# Patient Record
Sex: FEMALE | Race: WHITE | NOT HISPANIC OR LATINO | Employment: FULL TIME | URBAN - METROPOLITAN AREA
[De-identification: names, ages, dates, MRNs, and addresses within clinical notes are randomized per-mention and may not be internally consistent; named-entity substitution may affect disease eponyms.]

---

## 2017-01-11 ENCOUNTER — ALLSCRIPTS OFFICE VISIT (OUTPATIENT)
Dept: OTHER | Facility: OTHER | Age: 27
End: 2017-01-11

## 2017-05-17 ENCOUNTER — ALLSCRIPTS OFFICE VISIT (OUTPATIENT)
Dept: OTHER | Facility: OTHER | Age: 27
End: 2017-05-17

## 2017-08-15 ENCOUNTER — ALLSCRIPTS OFFICE VISIT (OUTPATIENT)
Dept: OTHER | Facility: OTHER | Age: 27
End: 2017-08-15

## 2017-12-06 ENCOUNTER — ALLSCRIPTS OFFICE VISIT (OUTPATIENT)
Dept: OTHER | Facility: OTHER | Age: 27
End: 2017-12-06

## 2017-12-07 NOTE — PROGRESS NOTES
Assessment  1  Migraine with aura (346 00) (G43 109)    Plan  Migraine with aura    · Amitriptyline HCl - 100 MG Oral Tablet; TAKE 1 TABLET AT BEDTIME   Rx By: Bhupinder Jung; Dispense: 30 Days ; #:30 Tablet; Refill: 3;Migraine with aura; THELMA = N; Verified Transmission to [facility] [address]; Last Updated By: System, [employer]; 12/6/2017 4:17:31 PM   · Verapamil HCl  MG Oral Capsule Extended Release 24 Hour; take onecapsule twice daily   Rx By: Bhupinder Jung; Dispense: 0 Days ; #:60 Capsule Extended Release 24 Hour; Refill: 4;Migraine with aura; THELMA = N; Verified Transmission to [facility] [address]; Last Updated By: System [employer]; 12/6/2017 4:17:31 PM    Discussion/Summary  Discussion Summary: At present, she's fairly controlled with her headaches except weather change  decrease elavil to 100mg qhs and see how she does on it  She is having weight gain and hyperlipidemia from it's chronic use  resume verapamil 120mg bid  natural options to add on for headaches such as magnesium, CoQ10, butter stanislav and melatonin  will resume abortive meds naproxen and imitrex as needed  Counseling Documentation With Imm: The patient was counseled regarding instructions for management,-- risk factor reductions,-- prognosis,-- patient and family education,-- impressions,-- risks and benefits of treatment options,-- importance of compliance with treatment  total time of encounter was 30 minutes-- and-- 20 minutes was spent counseling  Goals and Barriers: The patient has the current Goals: To lower elavil  Patient's Capacity to Self-Care: Patient is able to Self-Care  Medication SE Review and Pt Understands Tx: Possible side effects of new medications were reviewed with the patient/guardian today  The treatment plan was reviewed with the patient/guardian  The patient/guardian understands and agrees with the treatment plan    Patient Guardian understands agrees:  The treatment plan was reviewed with the patient/guardian  The patient/guardian understands and agrees with the treatment plan    Headache [facility]:  The patient was counseled regarding;   Discussed side effects of all medications prescribed today to the patient in detail  Patient education was completed today and we also discussed precautions for rebound headaches  --   When patient has a moderate to severe headache, they should seek rest, initiate relaxation and apply cold compresses to the head  Also recommended to the patient :  1  Maintain regular sleep schedule -- 2  Limit over the counter medications  (No more than 3 times a week)  -- 3  Maintain headache diary  -- 4  Limit caffeine to 1-2 cups a day or less  -- 5  Avoid dietary trigger  (list given to the patient and reviewed with them)  -- 6  Patient is to have regular frequent meals to prevent headache onset  Chief Complaint  Chief Complaint Free Text Note Form: migraines      History of Present Illness  HPI: Ms Elise Flowers is a 34 yo F with h/o migraines since childhood presents as follow up  Patient is a former patient of Dr Vanegas Listen  She was getting about 2 migraines a month  She takes combination of naproxen and imitrex and it works  She gets headaches in frontal region associated with nausea, photo and phonophobia  Her triggers are dehydration, stress, chocolate, food with nitrates, wine  Today she has brought calender with her of headaches and has noted that Rain and stress are major triggers for her migraines  She is interested in having elavil lowered or be tapered off as it has resulted in some weight gain  At last visit she was also started on Verapamil 120mg ER and is on bid  She is tolerating this well  In August and Sept she had 4 headaches in a cluster for 4 days around rain  currently on elavil 150mg qhs for 2 years  hasn't been tried on topamax or any other preventive medication  She denies any side effects  Review of Systems  Neurological ROS:  Neurological General: headache  Active Problems  1  Migraine headache (346 90) (G43 909)   2  Migraine with aura (346 00) (G43 109)   3  Seasonal allergies (477 9) (J30 2)    Past Medical History  1  History of Anxiety (300 00) (F41 9)   2  History of Cyclical vomiting syndrome (536 2) (G43 A0)   3  History of depression (V11 8) (Z86 59)    Surgical History  1  History of Oral Surgery Tooth Extraction [place] Tooth   2  History of Upper Gastrointestinal Endoscopy (Therapeutic)    Family History  Mother    1  Family history of migraine headaches (V17 2) (Z82 0)  Father    2  Family history of Seasonal affective disorder  Sister    3  Family history of Anxiety    Social History     · Alcohol use (V49 89) (Z78 9)   · Denied: History of Drug use   · Never a smoker    Current Meds   1  Allegra 180 MG TABS; Therapy: (Recorded:27Tro4178) to Recorded   2  Amitriptyline HCl - 150 MG Oral Tablet; TAKE 1 TABLET AT BEDTIME; Therapy: 97PBS0505 to (Evaluate:12Jan2018)  Requested for: 50Nvq7095; Last Rx:[id number] Ordered   3  Naproxen 500 MG Oral Tablet; Therapy: 99VZO0517 to Recorded   4  SUMAtriptan Succinate 100 MG Oral Tablet; TAKE 1 TABLET AT ONSET OF MIGRAINE HEADACHE  MAY REPEAT IN 2 HOURS IF NEEDED; Therapy: 99NBL0983 to (Evaluate:14Oct2017) Recorded   5  Verapamil HCl  MG Oral Tablet Extended Release; TAKE 1 TABLET TWICE DAILY,  WITH MORNING AND EVENING MEAL; Therapy: 75UHT2748 to (Evaluate:12Jan2018)  Requested for: 51Vxd7089; Last Rx:[id number] Ordered    Allergies  1  No Known Drug Allergies    2  Other   3  Chocolate    Vitals  Signs   Recorded: 68EFT5674 03:59PM   Heart Rate: 93  Systolic: 199, RUE, Sitting  Diastolic: 98, RUE, Sitting  Height: 5 ft 5 in  Weight: 274 lb   BMI Calculated: 45 6  BSA Calculated: 2 26    Physical Exam   Constitutional  General appearance: No acute distress, well appearing and well nourished     Eyes  Ophthalmoscopic examination: Abnormal   Bilateral optic discs: normal   Musculoskeletal  Gait and station: Normal gait, stance and balance  Muscle strength: Normal strength throughout  Muscle tone: No atrophy, abnormal movements, flaccidity, cogwheeling or spasticity  Neurologic  Orientation to person, place, and time: Normal    Recent and remote memory: Demonstrates normal memory  Attention span and concentration: Normal thought process and attention span  Language: Names objects, able to repeat phrases and speaks spontaneously  Fund of knowledge: Normal vocabulary with appropriate knowledge of current events and past history     2nd cranial nerve: Normal    3rd, 4th, and 6th cranial nerves: Normal    5th cranial nerve: Normal    7th cranial nerve: Normal    8th cranial nerve: Normal    9th cranial nerve: Normal    11th cranial nerve: Normal    12th cranial nerve: Normal    Sensation: Normal    Reflexes: Normal    Coordination: Normal        Signatures   Electronically signed by : COCO Novoa ; Dec  6 2017  4:22PM EST                       (Author)

## 2018-01-13 VITALS
HEIGHT: 65 IN | SYSTOLIC BLOOD PRESSURE: 122 MMHG | DIASTOLIC BLOOD PRESSURE: 80 MMHG | WEIGHT: 273 LBS | BODY MASS INDEX: 45.48 KG/M2

## 2018-01-23 VITALS
HEIGHT: 65 IN | DIASTOLIC BLOOD PRESSURE: 98 MMHG | SYSTOLIC BLOOD PRESSURE: 138 MMHG | BODY MASS INDEX: 45.65 KG/M2 | HEART RATE: 93 BPM | WEIGHT: 274 LBS

## 2018-03-29 ENCOUNTER — OFFICE VISIT (OUTPATIENT)
Dept: NEUROLOGY | Facility: CLINIC | Age: 28
End: 2018-03-29
Payer: COMMERCIAL

## 2018-03-29 VITALS
BODY MASS INDEX: 45.15 KG/M2 | HEIGHT: 65 IN | WEIGHT: 271 LBS | HEART RATE: 98 BPM | DIASTOLIC BLOOD PRESSURE: 88 MMHG | SYSTOLIC BLOOD PRESSURE: 118 MMHG

## 2018-03-29 DIAGNOSIS — G43.109 MIGRAINE WITH AURA AND WITHOUT STATUS MIGRAINOSUS, NOT INTRACTABLE: Primary | ICD-10-CM

## 2018-03-29 PROCEDURE — 99215 OFFICE O/P EST HI 40 MIN: CPT | Performed by: PSYCHIATRY & NEUROLOGY

## 2018-03-29 RX ORDER — AMITRIPTYLINE HYDROCHLORIDE 25 MG/1
TABLET, FILM COATED ORAL
Qty: 60 TABLET | Refills: 2 | Status: SHIPPED | OUTPATIENT
Start: 2018-03-29 | End: 2021-06-03 | Stop reason: ALTCHOICE

## 2018-03-29 RX ORDER — VERAPAMIL HYDROCHLORIDE 120 MG/1
120 CAPSULE, EXTENDED RELEASE ORAL 2 TIMES DAILY
Refills: 0 | COMMUNITY
Start: 2018-03-15 | End: 2018-05-30 | Stop reason: SDUPTHER

## 2018-03-29 RX ORDER — FEXOFENADINE HCL 180 MG/1
180 TABLET ORAL DAILY
COMMUNITY
End: 2019-07-30 | Stop reason: ALTCHOICE

## 2018-03-29 RX ORDER — TOPIRAMATE 100 MG/1
100 TABLET, FILM COATED ORAL
Qty: 30 TABLET | Refills: 0 | Status: SHIPPED | OUTPATIENT
Start: 2018-04-19 | End: 2018-05-30 | Stop reason: SDUPTHER

## 2018-03-29 RX ORDER — AMITRIPTYLINE HYDROCHLORIDE 100 MG/1
100 TABLET, FILM COATED ORAL
Refills: 0 | COMMUNITY
Start: 2018-03-15 | End: 2018-03-29 | Stop reason: SDUPTHER

## 2018-03-29 RX ORDER — TOPIRAMATE 25 MG/1
TABLET ORAL
Qty: 42 TABLET | Refills: 0 | Status: SHIPPED | OUTPATIENT
Start: 2018-03-29 | End: 2018-05-30

## 2018-03-29 RX ORDER — SUMATRIPTAN 100 MG/1
100 TABLET, FILM COATED ORAL ONCE AS NEEDED
Refills: 0 | COMMUNITY
Start: 2018-02-06 | End: 2018-07-19 | Stop reason: SDUPTHER

## 2018-03-29 RX ORDER — NAPROXEN 500 MG/1
TABLET ORAL
Refills: 0 | COMMUNITY
Start: 2018-02-06 | End: 2018-10-24 | Stop reason: SDUPTHER

## 2018-03-29 NOTE — PROGRESS NOTES
Return NeuroOutpatient Note        Sharon Schmitz  [id number]  9 y o   1990       Migraine        History obtained from: Patient     HPI/Subjective:    Ms Strong  is a 34 yo F with h/o migraines since childhood presents as follow up  Patient is a former patient of Dr Lynne Gains  She was getting 2 migraines a month but some months she can get upto 4 a month depending on weather change, humidity level  Abortive ones do help but she may feel odd sensation in the beginning for few minutes  At last visit, she wanted to decrease elavil for fear of weight loss but she hasn't noticed any change in her weight  She says it makes her difficult to loose weight  She is interested in switching from elavil to another agent  Past Medical History:   Diagnosis Date    H/O anxiety disorder     H/O: depression     Migraine with aura     Seasonal allergies      Social History     Social History    Marital status: Single     Spouse name: N/A    Number of children: N/A    Years of education: N/A     Occupational History    Not on file  Social History Main Topics    Smoking status: Never Smoker    Smokeless tobacco: Never Used    Alcohol use Yes      Comment: rarely    Drug use: No    Sexual activity: Not on file     Other Topics Concern    Not on file     Social History Narrative    No narrative on file     Family History   Problem Relation Age of Onset    Migraines Mother     Seasonal affective disorder Father     Anxiety disorder Sister     Migraines Maternal Aunt     Migraines Maternal Grandmother      Allergies   Allergen Reactions    Chocolate Headache    Other Headache     Annotation - [id number]: NITRATES- WINE     No current outpatient prescriptions on file prior to visit  No current facility-administered medications on file prior to visit  Review of Systems   Refer to positive review of systems in HPI     Constitutional- No fever  Eyes- No visual change  ENT- Hearing normal  CV- No chest pain  Resp- No Shortness of breath  GI- No diarrhea  - Bladder normal  MS- No Arthritis   Skin- No rash  Psych- No depression  Endo- No DM  Heme- No nodes    Vitals:    03/29/18 1322   BP: 118/88   BP Location: Left arm   Patient Position: Sitting   Pulse: 98   Weight: 123 kg (271 lb)   Height: 5' 5" (1 651 m)       PHYSICAL EXAM:  Appearance: No Acute Distress  Ophthalmoscopic: Disc Flat, Normal fundus  Mental status:  Orientation: Awake, Alert, and Orientedx3  Memory: Registation 3/3 Recall 3/3  Attention: normal  Knowledge: good  Language: No aphasia  Speech: No dysarthria  Cranial Nerves:  2 No Visual Defect on Confrontation, Pupils round, equal, reactive to light  3,4,6 Extraocular Movements Intact, no nystagmus  5 Facial Sensation Intact  7 No facial asymmetry  8 Intact hearing  9,10 Palate symmetric, normal gag  11 Good shoulder shrug  12 Tongue Midline  Gait: Stable  Coordination: No ataxia with finger to nose testing, and heel to shin  Sensory: Intact, Symmetric to pinprick, light touch, vibration, and joint position  Muscle Tone: Normal              Muscle exam:  Arm Right Left Leg Right Left   Deltoid 5/5 5/5 Iliopsoas 5/5 5/5   Biceps 5/5 5/5 Quads 5/5 5/5   Triceps 5/5 5/5 Hamstrings 5/5 5/5   Wrist Extension 5/5 5/5 Ankle Dorsi Flexion 5/5 5/5   Wrist Flexion 5/5 5/5 Ankle Plantar Flexion 5/5 5/5   Interossei 5/5 5/5 Ankle Eversion 5/5 5/5   APB 5/5 5/5 Ankle Inversion 5/5 5/5       Reflexes   RJ BJ TJ KJ AJ Plantars Santiago's   Right 2+ 2+ 2+ 2+ 2+ Downgoing Not present   Left 2+ 2+ 2+ 2+ 2+ Downgoing Not present     Personal review of  Labs:                    Diagnoses and all orders for this visit:        1  Migraine with aura and without status migrainosus, not intractable  topiramate (TOPAMAX) 25 mg tablet    topiramate (TOPAMAX) 100 mg tablet    amitriptyline (ELAVIL) 25 mg tablet     Patient has remained stable in terms of frequency of migraines     She does however wish to switch from elavil to another agent so will use topamax  Gave her instructions on how to start and around mid April slowly taper elavil to 25mg nightly  She will need to resume making notes on calender  She will resume verapamil 120mg bid  She may resume prn imitrex  Counseling Documentation:  The patient and/or patient's family were  counseled regarding diagnostic results  Instructions for management,risk factor reductions,prognosis of disease were discussed  Patient and family were educated regarding impressions,risks and benefits of treatment options,importance of compliance with treatment        Total time of encounter:  40 min  More than 50% of the time was used in counseling and/or coordination of care  Extent of counseling and/or coordination of care        Tony Sutherland MD  [facility]  [facility] [address]  [phone]

## 2018-05-29 ENCOUNTER — TELEPHONE (OUTPATIENT)
Dept: NEUROLOGY | Facility: CLINIC | Age: 28
End: 2018-05-29

## 2018-05-29 NOTE — TELEPHONE ENCOUNTER
Patient is now completely off Amitriptyline- She has been on Topamax 100 mg at bedtime since April  She was curious if there was anything else you wanted her to be? She also take Verapamil       Call back- [phone]

## 2018-05-29 NOTE — TELEPHONE ENCOUNTER
Tell her No  From last visit, she was stable in terms of her headaches  Due to weight gain, we had switched her to topamax, if headaches are worse, then may have to place her back on elavil

## 2018-05-30 DIAGNOSIS — G43.109 MIGRAINE WITH AURA AND WITHOUT STATUS MIGRAINOSUS, NOT INTRACTABLE: ICD-10-CM

## 2018-05-30 RX ORDER — TOPIRAMATE 100 MG/1
100 TABLET, FILM COATED ORAL
Qty: 30 TABLET | Refills: 1 | Status: SHIPPED | OUTPATIENT
Start: 2018-05-30 | End: 2018-10-24 | Stop reason: SDUPTHER

## 2018-05-30 RX ORDER — VERAPAMIL HYDROCHLORIDE 120 MG/1
120 CAPSULE, EXTENDED RELEASE ORAL 2 TIMES DAILY
Qty: 60 CAPSULE | Refills: 3 | Status: SHIPPED | OUTPATIENT
Start: 2018-05-30 | End: 2018-10-24 | Stop reason: SDUPTHER

## 2018-05-30 NOTE — TELEPHONE ENCOUNTER
[facility] does not wish to start any new medication at this time  Patient understood, requested refill for 2 meds

## 2018-07-17 RX ORDER — SUMATRIPTAN 100 MG/1
TABLET, FILM COATED ORAL
Qty: 9 TABLET | Refills: 1 | OUTPATIENT
Start: 2018-07-17

## 2018-07-19 DIAGNOSIS — R51.9 HEAD ACHE: Primary | ICD-10-CM

## 2018-07-19 RX ORDER — SUMATRIPTAN 100 MG/1
100 TABLET, FILM COATED ORAL SEE ADMIN INSTRUCTIONS
Qty: 9 TABLET | Refills: 0 | Status: SHIPPED | OUTPATIENT
Start: 2018-07-19 | End: 2018-10-24 | Stop reason: SDUPTHER

## 2018-07-25 ENCOUNTER — OFFICE VISIT (OUTPATIENT)
Dept: NEUROLOGY | Facility: CLINIC | Age: 28
End: 2018-07-25
Payer: COMMERCIAL

## 2018-07-25 VITALS
DIASTOLIC BLOOD PRESSURE: 98 MMHG | WEIGHT: 260 LBS | BODY MASS INDEX: 43.32 KG/M2 | SYSTOLIC BLOOD PRESSURE: 148 MMHG | HEART RATE: 97 BPM | HEIGHT: 65 IN

## 2018-07-25 DIAGNOSIS — G43.101 MIGRAINE WITH AURA AND WITH STATUS MIGRAINOSUS, NOT INTRACTABLE: Primary | ICD-10-CM

## 2018-07-25 PROCEDURE — 99215 OFFICE O/P EST HI 40 MIN: CPT | Performed by: PSYCHIATRY & NEUROLOGY

## 2018-07-25 RX ORDER — DIPHENHYDRAMINE HCL 25 MG
25 TABLET ORAL EVERY 8 HOURS PRN
Qty: 15 TABLET | Refills: 0 | Status: SHIPPED | OUTPATIENT
Start: 2018-07-25 | End: 2020-12-31 | Stop reason: ALTCHOICE

## 2018-07-25 RX ORDER — BUPROPION HYDROCHLORIDE 150 MG/1
150 TABLET ORAL DAILY
Refills: 0 | COMMUNITY
Start: 2018-06-08 | End: 2019-07-30

## 2018-07-25 RX ORDER — METHYLPREDNISOLONE 4 MG/1
TABLET ORAL
Qty: 21 TABLET | Refills: 0 | Status: SHIPPED | OUTPATIENT
Start: 2018-07-25 | End: 2020-12-31 | Stop reason: ALTCHOICE

## 2018-07-25 RX ORDER — PROCHLORPERAZINE MALEATE 10 MG
10 TABLET ORAL EVERY 8 HOURS PRN
Qty: 15 TABLET | Refills: 0 | Status: SHIPPED | OUTPATIENT
Start: 2018-07-25 | End: 2019-01-29 | Stop reason: SDUPTHER

## 2018-07-25 RX ORDER — DICLOFENAC SODIUM 75 MG/1
75 TABLET, DELAYED RELEASE ORAL 3 TIMES DAILY PRN
Qty: 15 TABLET | Refills: 0 | Status: SHIPPED | OUTPATIENT
Start: 2018-07-25 | End: 2018-10-24

## 2018-07-25 RX ORDER — BUTALBITAL, ACETAMINOPHEN AND CAFFEINE 50; 325; 40 MG/1; MG/1; MG/1
1 TABLET ORAL DAILY PRN
Qty: 30 TABLET | Refills: 0 | Status: SHIPPED | OUTPATIENT
Start: 2018-07-25 | End: 2018-10-24 | Stop reason: SDUPTHER

## 2018-07-25 NOTE — PROGRESS NOTES
Return NeuroOutpatient Note        Yamini Del Rosario  [id number]  20 y o   1990       Migraine        History obtained from: Patient     HPI/Subjective:    Ms Arvin Melendez is a 32 yo F with h/o migraines since childhood presents as follow up  Patient is a former patient of Dr Sushila Melgar  She was getting 2 migraines a month but some months she can get upto 4 a month depending on weather change, humidity level  Abortive ones do help but she may feel odd sensation in the beginning for few minutes  At last visit, she wanted to decrease elavil for fear of weight loss but she hasn't noticed any change in her weight  At last visit, we had switched her from elavil to topamax  This month, we have had a lot of rain and she has had a lot of headaches this month almost daily  She is under a lot of stress  She says sumavel used to work better than imitrex but her insurance was not covering  Now it may and she would like to try it  She is interested in new option available, Aimovig  Past Medical History:   Diagnosis Date    H/O anxiety disorder     H/O: depression     Migraine with aura     Seasonal allergies      Social History     Social History    Marital status: Single     Spouse name: N/A    Number of children: N/A    Years of education: N/A     Occupational History    Not on file       Social History Main Topics    Smoking status: Never Smoker    Smokeless tobacco: Never Used    Alcohol use Yes      Comment: rarely    Drug use: No    Sexual activity: Not on file     Other Topics Concern    Not on file     Social History Narrative    No narrative on file     Family History   Problem Relation Age of Onset    Migraines Mother     Seasonal affective disorder Father     Anxiety disorder Sister     Migraines Maternal Aunt     Migraines Maternal Grandmother      Allergies   Allergen Reactions    Chocolate Headache    Other Headache     Annotation - [id number]: NITRATES- WINE     Current Outpatient Prescriptions on File Prior to Visit   Medication Sig Dispense Refill    fexofenadine (ALLEGRA ALLERGY) 180 MG tablet Take 180 mg by mouth daily        naproxen (NAPROSYN) 500 mg tablet take 1 tablet by mouth WITH SUMTRIPTAN AT THE ONSET OF HEADACHE  0    SUMAtriptan (IMITREX) 100 mg tablet Take 1 tablet (100 mg total) by mouth see administration instructions At onset of Migraine 9 tablet 0    topiramate (TOPAMAX) 100 mg tablet Take 1 tablet (100 mg total) by mouth daily at bedtime Starting April 19th 30 tablet 1    verapamil (VERELAN PM) 120 MG 24 hr capsule Take 1 capsule (120 mg total) by mouth 2 (two) times a day 60 capsule 3    amitriptyline (ELAVIL) 25 mg tablet On April 15th, decrease elavil to 50mg nightly and in another 2 weeks take 25mg at bed time  60 tablet 2     No current facility-administered medications on file prior to visit  Review of Systems   Refer to positive review of systems in HPI     Constitutional- No fever  Eyes- No visual change  ENT- Hearing normal  CV- No chest pain  Resp- No Shortness of breath  GI- No diarrhea  - Bladder normal  MS- No Arthritis   Skin- No rash  Psych- No depression  Endo- No DM  Heme- No nodes    Vitals:    07/25/18 1447   BP: 148/98   BP Location: Left arm   Patient Position: Sitting   Pulse: 97   Weight: 118 kg (260 lb)   Height: 5' 5" (1 651 m)       PHYSICAL EXAM:  Appearance: No Acute Distress  Ophthalmoscopic: Disc Flat, Normal fundus  Mental status:  Orientation: Awake, Alert, and Orientedx3  Memory: Registation 3/3 Recall 3/3  Attention: normal  Knowledge: good  Language: No aphasia  Speech: No dysarthria  Cranial Nerves:  2 No Visual Defect on Confrontation, Pupils round, equal, reactive to light  3,4,6 Extraocular Movements Intact, no nystagmus  5 Facial Sensation Intact  7 No facial asymmetry  8 Intact hearing  9,10 Palate symmetric, normal gag  11 Good shoulder shrug  12 Tongue Midline  Gait: Stable  Coordination: No ataxia with finger to nose testing, and heel to shin  Sensory: Intact, Symmetric to pinprick, light touch, vibration, and joint position  Muscle Tone: Normal              Muscle exam:  Arm Right Left Leg Right Left   Deltoid 5/5 5/5 Iliopsoas 5/5 5/5   Biceps 5/5 5/5 Quads 5/5 5/5   Triceps 5/5 5/5 Hamstrings 5/5 5/5   Wrist Extension 5/5 5/5 Ankle Dorsi Flexion 5/5 5/5   Wrist Flexion 5/5 5/5 Ankle Plantar Flexion 5/5 5/5   Interossei 5/5 5/5 Ankle Eversion 5/5 5/5   APB 5/5 5/5 Ankle Inversion 5/5 5/5       Reflexes   RJ BJ TJ KJ AJ Plantars Santiago's   Right 2+ 2+ 2+ 2+ 2+ Downgoing Not present   Left 2+ 2+ 2+ 2+ 2+ Downgoing Not present     Personal review of  Labs:                    Diagnoses and all orders for this visit:        1  Migraine with aura and with status migrainosus, not intractable  Methylprednisolone 4 MG TBPK    diclofenac (VOLTAREN) 75 mg EC tablet    diphenhydrAMINE (BENADRYL) 25 mg tablet    prochlorperazine (COMPAZINE) 10 mg tablet    butalbital-acetaminophen-caffeine (FIORICET,ESGIC) -40 mg per tablet    SUMAtriptan Succinate 6 MG/0 5ML SOTJ     Patient has now had return of headaches for past month  Weather could have contributed but is unclear  To break the cycle, will give her medrol dose pack and migraine oral cocktail to take as needed  Will arrange paper work for Aimovig injections 140mg sc monthly  She will resume verapamil 120mg bid and topamax 100mg nightly  She may resume prn imitrex or sumavel inj  She did loose weight after stopping elavil  Counseling Documentation:  The patient and/or patient's family were  counseled regarding diagnostic results  Instructions for management,risk factor reductions,prognosis of disease were discussed  Patient and family were educated regarding impressions,risks and benefits of treatment options,importance of compliance with treatment        Total time of encounter:  40 min  More than 50% of the time was used in counseling and/or coordination of care  Extent of counseling and/or coordination of care        MD Sai Scanlon [facility]  [facility] [address]  [address]  [phone]

## 2018-08-15 DIAGNOSIS — G43.109 MIGRAINE WITH AURA AND WITHOUT STATUS MIGRAINOSUS, NOT INTRACTABLE: ICD-10-CM

## 2018-08-16 NOTE — TELEPHONE ENCOUNTER
A verbal refill was called in for Topamax 100 mg 1 hs #30 with 1 refill to [facility] in [facility]

## 2018-08-24 RX ORDER — TOPIRAMATE 100 MG/1
TABLET, FILM COATED ORAL
Qty: 30 TABLET | Refills: 1 | OUTPATIENT
Start: 2018-08-24

## 2018-10-24 ENCOUNTER — OFFICE VISIT (OUTPATIENT)
Dept: NEUROLOGY | Facility: CLINIC | Age: 28
End: 2018-10-24
Payer: COMMERCIAL

## 2018-10-24 VITALS
HEART RATE: 87 BPM | HEIGHT: 65 IN | SYSTOLIC BLOOD PRESSURE: 122 MMHG | WEIGHT: 240 LBS | BODY MASS INDEX: 39.99 KG/M2 | DIASTOLIC BLOOD PRESSURE: 90 MMHG

## 2018-10-24 DIAGNOSIS — G43.101 MIGRAINE WITH AURA AND WITH STATUS MIGRAINOSUS, NOT INTRACTABLE: ICD-10-CM

## 2018-10-24 DIAGNOSIS — G43.109 MIGRAINE WITH AURA AND WITHOUT STATUS MIGRAINOSUS, NOT INTRACTABLE: Primary | ICD-10-CM

## 2018-10-24 PROCEDURE — 99214 OFFICE O/P EST MOD 30 MIN: CPT | Performed by: PSYCHIATRY & NEUROLOGY

## 2018-10-24 RX ORDER — LORAZEPAM 0.5 MG/1
TABLET ORAL
Refills: 0 | COMMUNITY
Start: 2018-10-22

## 2018-10-24 RX ORDER — VERAPAMIL HYDROCHLORIDE 120 MG/1
120 CAPSULE, EXTENDED RELEASE ORAL 2 TIMES DAILY
Qty: 60 CAPSULE | Refills: 4 | Status: SHIPPED | OUTPATIENT
Start: 2018-10-24 | End: 2019-01-29 | Stop reason: SDUPTHER

## 2018-10-24 RX ORDER — SUMATRIPTAN 100 MG/1
100 TABLET, FILM COATED ORAL SEE ADMIN INSTRUCTIONS
Qty: 9 TABLET | Refills: 0 | Status: SHIPPED | OUTPATIENT
Start: 2018-10-24 | End: 2019-01-29 | Stop reason: SDUPTHER

## 2018-10-24 RX ORDER — NAPROXEN 500 MG/1
500 TABLET ORAL AS NEEDED
Qty: 30 TABLET | Refills: 0 | Status: SHIPPED | OUTPATIENT
Start: 2018-10-24 | End: 2019-08-27 | Stop reason: SDUPTHER

## 2018-10-24 RX ORDER — TOPIRAMATE 100 MG/1
100 TABLET, FILM COATED ORAL
Qty: 30 TABLET | Refills: 4 | Status: SHIPPED | OUTPATIENT
Start: 2018-10-24 | End: 2019-01-29 | Stop reason: SDUPTHER

## 2018-10-24 RX ORDER — BUTALBITAL, ACETAMINOPHEN AND CAFFEINE 50; 325; 40 MG/1; MG/1; MG/1
1 TABLET ORAL DAILY PRN
Qty: 30 TABLET | Refills: 0 | Status: SHIPPED | OUTPATIENT
Start: 2018-10-24 | End: 2019-01-29 | Stop reason: SDUPTHER

## 2018-10-24 NOTE — PROGRESS NOTES
Return NeuroOutpatient Note        Hanna Lang  [id number]  62 y o   1990       Migraine        History obtained from: Patient     HPI/Subjective:    Ms Devi Romero is a 32 yo F with h/o migraines since childhood presents as follow up  Patient is a former patient of Dr Erica Leroy  She was getting 2 migraines a month but some months she can get upto 4 a month depending on weather change, humidity level  Abortive ones do help but she may feel odd sensation in the beginning for few minutes  Due to weight gain, she wanted to come off of elavil so we had tapered it  She's still on verapamil 120mg bid and topamax 100mg daily  We had started her on Aimovig at last visit and thus far she has taken one session of 140mg  She does report that it's helped  Now she doesn't get prolonged duration of headaches  In month of Sept, she had about 5 days of headaches  Prior to that, she was getting cycle of daily headaches  She says she is under a lot of stress from work and grad school  She takes prn imitrex but would typically need to lie down after taking it  Past Medical History:   Diagnosis Date    H/O anxiety disorder     H/O: depression     Migraine with aura     Seasonal allergies      Social History     Social History    Marital status: Single     Spouse name: N/A    Number of children: N/A    Years of education: N/A     Occupational History    Not on file       Social History Main Topics    Smoking status: Never Smoker    Smokeless tobacco: Never Used    Alcohol use Yes      Comment: rarely    Drug use: No    Sexual activity: Not on file     Other Topics Concern    Not on file     Social History Narrative    No narrative on file     Family History   Problem Relation Age of Onset    Migraines Mother     Seasonal affective disorder Father     Anxiety disorder Sister     Migraines Maternal Aunt     Migraines Maternal Grandmother      Allergies   Allergen Reactions    Chocolate Headache    Other Headache     Annotation - [id number]: NITRATES- WINE     Current Outpatient Prescriptions on File Prior to Visit   Medication Sig Dispense Refill    buPROPion (WELLBUTRIN XL) 150 mg 24 hr tablet Take 150 mg by mouth daily  0    diphenhydrAMINE (BENADRYL) 25 mg tablet Take 1 tablet (25 mg total) by mouth every 8 (eight) hours as needed (migraine) 15 tablet 0    fexofenadine (ALLEGRA ALLERGY) 180 MG tablet Take 180 mg by mouth daily        prochlorperazine (COMPAZINE) 10 mg tablet Take 1 tablet (10 mg total) by mouth every 8 (eight) hours as needed for nausea or vomiting (migraine) With diclofenac sodium and benadryl 15 tablet 0    SUMAtriptan Succinate 6 MG/0 5ML SOTJ Inject 0 5 mL (6 mg total) under the skin once as needed (intractable migraine at onset) for up to 1 dose 6 Syringe 0    [DISCONTINUED] butalbital-acetaminophen-caffeine (FIORICET,ESGIC) -40 mg per tablet Take 1 tablet by mouth daily as needed for migraine 30 tablet 0    [DISCONTINUED] diclofenac (VOLTAREN) 75 mg EC tablet Take 1 tablet (75 mg total) by mouth 3 (three) times a day as needed (headache) 15 tablet 0    [DISCONTINUED] naproxen (NAPROSYN) 500 mg tablet take 1 tablet by mouth WITH SUMTRIPTAN AT THE ONSET OF HEADACHE  0    [DISCONTINUED] SUMAtriptan (IMITREX) 100 mg tablet Take 1 tablet (100 mg total) by mouth see administration instructions At onset of Migraine 9 tablet 0    [DISCONTINUED] topiramate (TOPAMAX) 100 mg tablet Take 1 tablet (100 mg total) by mouth daily at bedtime Starting April 19th 30 tablet 1    [DISCONTINUED] verapamil (VERELAN PM) 120 MG 24 hr capsule Take 1 capsule (120 mg total) by mouth 2 (two) times a day 60 capsule 3    amitriptyline (ELAVIL) 25 mg tablet On April 15th, decrease elavil to 50mg nightly and in another 2 weeks take 25mg at bed time   (Patient not taking: Reported on 10/24/2018 ) 60 tablet 2    Methylprednisolone 4 MG TBPK Use as directed on package (Patient not taking: Reported on 10/24/2018 ) 21 tablet 0     No current facility-administered medications on file prior to visit  Review of Systems   Refer to positive review of systems in HPI  Constitutional- No fever  Eyes- No visual change  ENT- Hearing normal  CV- No chest pain  Resp- No Shortness of breath  GI- No diarrhea  - Bladder normal  MS- No Arthritis   Skin- No rash  Psych- No depression  Endo- No DM  Heme- No nodes    Vitals:    10/24/18 1436   BP: 122/90   BP Location: Left arm   Patient Position: Sitting   Pulse: 87   Weight: 109 kg (240 lb)   Height: 5' 5" (1 651 m)       PHYSICAL EXAM:  Appearance: No Acute Distress  Ophthalmoscopic: Disc Flat, Normal fundus  Mental status:    Orientation: Awake, Alert, and Orientedx3  Memory: Registation 3/3 Recall 3/3  Attention: normal  Knowledge: good  Language: No aphasia  Speech: No dysarthria  Cranial Nerves:  2 No Visual Defect on Confrontation, Pupils round, equal, reactive to light  3,4,6 Extraocular Movements Intact, no nystagmus  5 Facial Sensation Intact  7 No facial asymmetry  8 Intact hearing  9,10 Palate symmetric, normal gag  11 Good shoulder shrug  12 Tongue Midline  Gait: Stable  Coordination: No ataxia with finger to nose testing, and heel to shin  Sensory: Intact, Symmetric to pinprick, light touch, vibration, and joint position  Muscle Tone: Normal              Muscle exam:  Arm Right Left Leg Right Left   Deltoid 5/5 5/5 Iliopsoas 5/5 5/5   Biceps 5/5 5/5 Quads 5/5 5/5   Triceps 5/5 5/5 Hamstrings 5/5 5/5   Wrist Extension 5/5 5/5 Ankle Dorsi Flexion 5/5 5/5   Wrist Flexion 5/5 5/5 Ankle Plantar Flexion 5/5 5/5   Interossei 5/5 5/5 Ankle Eversion 5/5 5/5   APB 5/5 5/5 Ankle Inversion 5/5 5/5       Reflexes   RJ BJ TJ KJ AJ Plantars Santiago's   Right 2+ 2+ 2+ 2+ 2+ Downgoing Not present   Left 2+ 2+ 2+ 2+ 2+ Downgoing Not present     Personal review of  Labs:                    Diagnoses and all orders for this visit:        1   Migraine with aura and without status migrainosus, not intractable  butalbital-acetaminophen-caffeine (FIORICET,ESGIC) -40 mg per tablet    verapamil (VERELAN PM) 120 MG 24 hr capsule    SUMAtriptan (IMITREX) 100 mg tablet    topiramate (TOPAMAX) 100 mg tablet    naproxen (NAPROSYN) 500 mg tablet   2  Migraine with aura and with status migrainosus, not intractable  butalbital-acetaminophen-caffeine (FIORICET,ESGIC) -40 mg per tablet       Patient is doing better now  Would resume prior preventive meds including Aimovig injections  She may resume prn imitrex or sumavel inj  She did loose weight after stopping elavil  Discussed that it's not advised to get pregnant while taking all these medications so she knows to plan it in advance  Counseling Documentation:  The patient and/or patient's family were  counseled regarding diagnostic results  Instructions for management,risk factor reductions,prognosis of disease were discussed  Patient and family were educated regarding impressions,risks and benefits of treatment options,importance of compliance with treatment        Total time of encounter:  30 min  More than 50% of the time was used in counseling and/or coordination of care  Extent of counseling and/or coordination of care        MD [facility]  [id number] [address]  [place]   [phone]

## 2019-01-29 ENCOUNTER — OFFICE VISIT (OUTPATIENT)
Dept: NEUROLOGY | Facility: CLINIC | Age: 29
End: 2019-01-29
Payer: COMMERCIAL

## 2019-01-29 VITALS
RESPIRATION RATE: 18 BRPM | HEIGHT: 65 IN | BODY MASS INDEX: 40.48 KG/M2 | HEART RATE: 93 BPM | DIASTOLIC BLOOD PRESSURE: 80 MMHG | SYSTOLIC BLOOD PRESSURE: 118 MMHG | WEIGHT: 243 LBS

## 2019-01-29 DIAGNOSIS — G43.101 MIGRAINE WITH AURA AND WITH STATUS MIGRAINOSUS, NOT INTRACTABLE: ICD-10-CM

## 2019-01-29 DIAGNOSIS — G43.109 MIGRAINE WITH AURA AND WITHOUT STATUS MIGRAINOSUS, NOT INTRACTABLE: ICD-10-CM

## 2019-01-29 DIAGNOSIS — G43.119 INTRACTABLE MIGRAINE WITH AURA WITHOUT STATUS MIGRAINOSUS: Primary | ICD-10-CM

## 2019-01-29 DIAGNOSIS — R11.0 NAUSEA: ICD-10-CM

## 2019-01-29 PROCEDURE — 99214 OFFICE O/P EST MOD 30 MIN: CPT | Performed by: PSYCHIATRY & NEUROLOGY

## 2019-01-29 RX ORDER — BUTALBITAL, ACETAMINOPHEN AND CAFFEINE 50; 325; 40 MG/1; MG/1; MG/1
1 TABLET ORAL DAILY PRN
Qty: 30 TABLET | Refills: 0 | Status: SHIPPED | OUTPATIENT
Start: 2019-01-29 | End: 2019-08-06 | Stop reason: SDUPTHER

## 2019-01-29 RX ORDER — VERAPAMIL HYDROCHLORIDE 120 MG/1
120 CAPSULE, EXTENDED RELEASE ORAL 2 TIMES DAILY
Qty: 60 CAPSULE | Refills: 4 | Status: SHIPPED | OUTPATIENT
Start: 2019-01-29 | End: 2019-08-06 | Stop reason: SDUPTHER

## 2019-01-29 RX ORDER — PROCHLORPERAZINE MALEATE 10 MG
10 TABLET ORAL EVERY 8 HOURS PRN
Qty: 15 TABLET | Refills: 0 | Status: SHIPPED | OUTPATIENT
Start: 2019-01-29 | End: 2020-12-31 | Stop reason: ALTCHOICE

## 2019-01-29 RX ORDER — TOPIRAMATE 100 MG/1
100 TABLET, FILM COATED ORAL
Qty: 30 TABLET | Refills: 4 | Status: SHIPPED | OUTPATIENT
Start: 2019-01-29 | End: 2019-08-06 | Stop reason: SDUPTHER

## 2019-01-29 RX ORDER — SUMATRIPTAN 100 MG/1
100 TABLET, FILM COATED ORAL SEE ADMIN INSTRUCTIONS
Qty: 9 TABLET | Refills: 0 | Status: SHIPPED | OUTPATIENT
Start: 2019-01-29 | End: 2019-08-06 | Stop reason: SDUPTHER

## 2019-01-29 NOTE — PROGRESS NOTES
Return NeuroOutpatient Note        Teja Walters  [id number]  73 y o   1990       Migraine        History obtained from:  Patient     HPI/Subjective:  Ms Jesús Green is a 32 yo F with h/o migraines since childhood presents as follow up  Patient is a former patient of Dr De Jesus Dawley  She was getting 2 migraines a month but some months she can get upto 4 a month depending on weather change, humidity level  Abortive ones do help but she may feel odd sensation in the beginning for few minutes  Due to weight gain, she wanted to come off of elavil so we had tapered it  She's still on verapamil 120mg bid and topamax 100mg daily  Since last visit, she has had about 3-4 headaches  She says there is a lot of stress at work and home  imitrex does work when she can use it  She can't use them at work because she feels she has to lie down after taking it  Past Medical History:   Diagnosis Date    H/O anxiety disorder     H/O: depression     Migraine with aura     Seasonal allergies      Social History     Social History    Marital status: Single     Spouse name: N/A    Number of children: N/A    Years of education: N/A     Occupational History    Not on file       Social History Main Topics    Smoking status: Never Smoker    Smokeless tobacco: Never Used    Alcohol use Yes      Comment: rarely    Drug use: No    Sexual activity: Not on file     Other Topics Concern    Not on file     Social History Narrative    No narrative on file     Family History   Problem Relation Age of Onset    Migraines Mother     Seasonal affective disorder Father     Anxiety disorder Sister     Migraines Maternal Aunt     Migraines Maternal Grandmother      Allergies   Allergen Reactions    Chocolate Headache    Other Headache     Annotation - [id number]: NITRATES- WINE     Current Outpatient Prescriptions on File Prior to Visit   Medication Sig Dispense Refill    buPROPion (WELLBUTRIN XL) 150 mg 24 hr tablet Take 150 mg by mouth daily  0    diphenhydrAMINE (BENADRYL) 25 mg tablet Take 1 tablet (25 mg total) by mouth every 8 (eight) hours as needed (migraine) 15 tablet 0    LORazepam (ATIVAN) 0 5 mg tablet TK 1 T PO  BID PRN  0    naproxen (NAPROSYN) 500 mg tablet Take 1 tablet (500 mg total) by mouth as needed for mild pain or moderate pain (with imitrex) 30 tablet 0    [DISCONTINUED] butalbital-acetaminophen-caffeine (FIORICET,ESGIC) -40 mg per tablet Take 1 tablet by mouth daily as needed for migraine 30 tablet 0    [DISCONTINUED] prochlorperazine (COMPAZINE) 10 mg tablet Take 1 tablet (10 mg total) by mouth every 8 (eight) hours as needed for nausea or vomiting (migraine) With diclofenac sodium and benadryl 15 tablet 0    [DISCONTINUED] SUMAtriptan (IMITREX) 100 mg tablet Take 1 tablet (100 mg total) by mouth see administration instructions At onset of Migraine 9 tablet 0    [DISCONTINUED] SUMAtriptan Succinate 6 MG/0 5ML SOTJ Inject 0 5 mL (6 mg total) under the skin once as needed (intractable migraine at onset) for up to 1 dose 6 Syringe 0    [DISCONTINUED] topiramate (TOPAMAX) 100 mg tablet Take 1 tablet (100 mg total) by mouth daily at bedtime Starting April 19th 30 tablet 4    [DISCONTINUED] verapamil (VERELAN PM) 120 MG 24 hr capsule Take 1 capsule (120 mg total) by mouth 2 (two) times a day 60 capsule 4    amitriptyline (ELAVIL) 25 mg tablet On April 15th, decrease elavil to 50mg nightly and in another 2 weeks take 25mg at bed time  (Patient not taking: Reported on 10/24/2018 ) 60 tablet 2    fexofenadine (ALLEGRA ALLERGY) 180 MG tablet Take 180 mg by mouth daily        Methylprednisolone 4 MG TBPK Use as directed on package (Patient not taking: Reported on 10/24/2018 ) 21 tablet 0    [DISCONTINUED] diclofenac (VOLTAREN) 75 mg EC tablet Take 1 tablet (75 mg total) by mouth 3 (three) times a day as needed (headache) 15 tablet 0     No current facility-administered medications on file prior to visit  Review of Systems   Refer to positive review of systems in HPI  Review of Systems    Constitutional- No fever, + headaches   Eyes- No visual change  ENT- Hearing normal  CV- No chest pain  Resp- No Shortness of breath  GI- No diarrhea  - Bladder normal  MS- No Arthritis   Skin- No rash  Psych- No depression  Endo- No DM  Heme- No nodes    Vitals:    01/29/19 1533   BP: 118/80   BP Location: Left arm   Patient Position: Sitting   Cuff Size: Adult   Pulse: 93   Resp: 18   Weight: 110 kg (243 lb)   Height: 5' 5" (1 651 m)       PHYSICAL EXAM:  Appearance: No Acute Distress  Ophthalmoscopic: Disc Flat, Normal fundus  Mental status:  Orientation: Awake, Alert, and Orientedx3  Memory: Registation 3/3 Recall 3/3  Attention: normal  Knowledge: good  Language: No aphasia  Speech: No dysarthria  Cranial Nerves:  2 No Visual Defect on Confrontation, Pupils round, equal, reactive to light  3,4,6 Extraocular Movements Intact, no nystagmus  5 Facial Sensation Intact  7 No facial asymmetry  8 Intact hearing  9,10 Palate symmetric, normal gag  11 Good shoulder shrug  12 Tongue Midline  Gait: Stable  Coordination: No ataxia with finger to nose testing, and heel to shin  Sensory: Intact, Symmetric to pinprick, light touch, vibration, and joint position  Muscle Tone: Normal              Muscle exam:  Arm Right Left Leg Right Left   Deltoid 5/5 5/5 Iliopsoas 5/5 5/5   Biceps 5/5 5/5 Quads 5/5 5/5   Triceps 5/5 5/5 Hamstrings 5/5 5/5   Wrist Extension 5/5 5/5 Ankle Dorsi Flexion 5/5 5/5   Wrist Flexion 5/5 5/5 Ankle Plantar Flexion 5/5 5/5   Interossei 5/5 5/5 Ankle Eversion 5/5 5/5   APB 5/5 5/5 Ankle Inversion 5/5 5/5       Reflexes   RJ BJ TJ KJ AJ Plantars Santiago's   Right 2+ 2+ 2+ 2+ 2+ Downgoing Not present   Left 2+ 2+ 2+ 2+ 2+ Downgoing Not present     Personal review of  Labs:                    Diagnoses and all orders for this visit:        1   Intractable migraine with aura without status migrainosus Galcanezumab-gnlm (EMGALITY) 120 MG/ML SOAJ   2  Migraine with aura and without status migrainosus, not intractable  verapamil (VERELAN PM) 120 MG 24 hr capsule    topiramate (TOPAMAX) 100 mg tablet    butalbital-acetaminophen-caffeine (FIORICET,ESGIC) -40 mg per tablet    SUMAtriptan (IMITREX) 100 mg tablet   3  Migraine with aura and with status migrainosus, not intractable  butalbital-acetaminophen-caffeine (FIORICET,ESGIC) -40 mg per tablet    prochlorperazine (COMPAZINE) 10 mg tablet   4  Nausea  prochlorperazine (COMPAZINE) 10 mg tablet       Patient is not well controlled at all  It's likely that elavil was controlling them but since we had to taper it due to weight gain, they are returning  Discussed not taking daily otc, fioricet or imitrex  Will start her on Emgality  Loading dose of 240mg and then 120mg once monthly  Will resume topamax and verapamil for prevention  Will prescribe prn imitrex and fioricets  Total time of encounter:  30 min  More than 50% of the time was used in counseling and/or coordination of care  Extent of counseling and/or coordination of care        MD [facility]  [address]  [place] [address]  [phone]

## 2019-02-22 ENCOUNTER — TELEPHONE (OUTPATIENT)
Dept: NEUROLOGY | Facility: CLINIC | Age: 29
End: 2019-02-22

## 2019-02-22 DIAGNOSIS — G43.919 INTRACTABLE MIGRAINE: Primary | ICD-10-CM

## 2019-02-22 RX ORDER — KETOROLAC TROMETHAMINE 10 MG/1
10 TABLET, FILM COATED ORAL EVERY 8 HOURS PRN
Qty: 20 TABLET | Refills: 0 | Status: SHIPPED | OUTPATIENT
Start: 2019-02-22 | End: 2020-12-31 | Stop reason: ALTCHOICE

## 2019-02-22 NOTE — TELEPHONE ENCOUNTER
Patient states she has a migraine since Tuesday, she did not go to work from Wednesday until today  She has diarrhea, and vomiting, and doesn't know what to do about it  She has been taking all her migraine meds and the migraine is still not going away  She wants advice on what to do

## 2019-04-18 ENCOUNTER — TELEPHONE (OUTPATIENT)
Dept: NEUROLOGY | Facility: CLINIC | Age: 29
End: 2019-04-18

## 2019-04-19 ENCOUNTER — TELEPHONE (OUTPATIENT)
Dept: NEUROLOGY | Facility: CLINIC | Age: 29
End: 2019-04-19

## 2019-07-30 ENCOUNTER — OFFICE VISIT (OUTPATIENT)
Dept: NEUROLOGY | Facility: CLINIC | Age: 29
End: 2019-07-30
Payer: COMMERCIAL

## 2019-07-30 VITALS
BODY MASS INDEX: 42.15 KG/M2 | HEART RATE: 94 BPM | SYSTOLIC BLOOD PRESSURE: 116 MMHG | DIASTOLIC BLOOD PRESSURE: 85 MMHG | WEIGHT: 253 LBS | HEIGHT: 65 IN

## 2019-07-30 DIAGNOSIS — G43.119 INTRACTABLE MIGRAINE WITH AURA WITHOUT STATUS MIGRAINOSUS: ICD-10-CM

## 2019-07-30 PROCEDURE — 99214 OFFICE O/P EST MOD 30 MIN: CPT | Performed by: PSYCHIATRY & NEUROLOGY

## 2019-07-30 RX ORDER — ONDANSETRON 4 MG/1
TABLET, ORALLY DISINTEGRATING ORAL
Refills: 2 | COMMUNITY
Start: 2019-07-03

## 2019-07-30 RX ORDER — CETIRIZINE HYDROCHLORIDE 10 MG/1
10 TABLET ORAL AS NEEDED
COMMUNITY

## 2019-07-30 RX ORDER — BUPROPION HYDROCHLORIDE 300 MG/1
150 TABLET ORAL
Refills: 1 | COMMUNITY
Start: 2019-07-03

## 2019-07-30 NOTE — PROGRESS NOTES
Return NeuroOutpatient Note        Eric Wright  [id number]  34 y o   1990       Migraine        History obtained from:  Patient     HPI/Subjective:  Ms  Alvin Gonzalez is a 31 yo F with h/o migraines since childhood presents as follow up  Patient is a former patient of Dr Elisa Schneider  She was getting 2 migraines a month but some months she can get upto 4 a month depending on weather change, humidity level  Abortive ones do help but she may feel odd sensation in the beginning for few minutes  Due to weight gain, she wanted to come off of elavil so we had tapered it  She's still on verapamil 120mg bid and topamax 100mg daily  She doesn't remember being tried on propranolol  Today she reports about 10-15 noticeable headaches a month  Her stress level at work and home has also increased  Patient has tried 2 months of Aimovig  After that her insurance didn't help  It had helped  It did however gave her GI side effects  She was feeling nauseous with it  At last visit we had ordered Emgality but it's still waiting auth       Past Medical History:   Diagnosis Date    H/O anxiety disorder     H/O: depression     Migraine with aura     Seasonal allergies      Social History     Socioeconomic History    Marital status: Single     Spouse name: Not on file    Number of children: Not on file    Years of education: Not on file    Highest education level: Not on file   Occupational History    Not on file   Social Needs    Financial resource strain: Not on file    Food insecurity:     Worry: Not on file     Inability: Not on file    Transportation needs:     Medical: Not on file     Non-medical: Not on file   Tobacco Use    Smoking status: Never Smoker    Smokeless tobacco: Never Used   Substance and Sexual Activity    Alcohol use: Yes     Comment: rarely    Drug use: No    Sexual activity: Not on file   Lifestyle    Physical activity:     Days per week: Not on file     Minutes per session: Not on file    Stress: Not on file   Relationships    Social connections:     Talks on phone: Not on file     Gets together: Not on file     Attends [employer] service: Not on file     Active member of club or organization: Not on file     Attends meetings of clubs or organizations: Not on file     Relationship status: Not on file    Intimate partner violence:     Fear of current or ex partner: Not on file     Emotionally abused: Not on file     Physically abused: Not on file     Forced sexual activity: Not on file   Other Topics Concern    Not on file   Social History Narrative    Not on file     Family History   Problem Relation Age of Onset    Migraines Mother     Seasonal affective disorder Father     Anxiety disorder Sister     Migraines Maternal Aunt     Migraines Maternal Grandmother      Allergies   Allergen Reactions    Chocolate Headache    Other Headache     Annotation - [id number]: NITRATES- WINE     Current Outpatient Medications on File Prior to Visit   Medication Sig Dispense Refill    buPROPion (WELLBUTRIN XL) 300 mg 24 hr tablet TK 1 T PO QHS  1    butalbital-acetaminophen-caffeine (FIORICET,ESGIC) -40 mg per tablet Take 1 tablet by mouth daily as needed for migraine 30 tablet 0    cetirizine (ZyrTEC) 10 mg tablet Take 10 mg by mouth daily      ketorolac (TORADOL) 10 mg tablet Take 1 tablet (10 mg total) by mouth every 8 (eight) hours as needed for moderate pain (headache) 20 tablet 0    LORazepam (ATIVAN) 0 5 mg tablet TK 1 T PO  BID PRN  0    naproxen (NAPROSYN) 500 mg tablet Take 1 tablet (500 mg total) by mouth as needed for mild pain or moderate pain (with imitrex) 30 tablet 0    ondansetron (ZOFRAN-ODT) 4 mg disintegrating tablet DIS 1 TO 2 TS ON THE TONGUE Q 8 H PRN  2    prochlorperazine (COMPAZINE) 10 mg tablet Take 1 tablet (10 mg total) by mouth every 8 (eight) hours as needed for nausea or vomiting (migraine) With diclofenac sodium and benadryl 15 tablet 0    SUMAtriptan (IMITREX) 100 mg tablet Take 1 tablet (100 mg total) by mouth see administration instructions At onset of Migraine 9 tablet 0    topiramate (TOPAMAX) 100 mg tablet Take 1 tablet (100 mg total) by mouth daily at bedtime Starting April 19th 30 tablet 4    verapamil (VERELAN PM) 120 MG 24 hr capsule Take 1 capsule (120 mg total) by mouth 2 (two) times a day 60 capsule 4    amitriptyline (ELAVIL) 25 mg tablet On April 15th, decrease elavil to 50mg nightly and in another 2 weeks take 25mg at bed time  (Patient not taking: Reported on 10/24/2018 ) 60 tablet 2    diphenhydrAMINE (BENADRYL) 25 mg tablet Take 1 tablet (25 mg total) by mouth every 8 (eight) hours as needed (migraine) (Patient not taking: Reported on 7/30/2019) 15 tablet 0    Methylprednisolone 4 MG TBPK Use as directed on package (Patient not taking: Reported on 10/24/2018 ) 21 tablet 0    [DISCONTINUED] buPROPion (WELLBUTRIN XL) 150 mg 24 hr tablet Take 150 mg by mouth daily  0    [DISCONTINUED] diclofenac (VOLTAREN) 75 mg EC tablet Take 1 tablet (75 mg total) by mouth 3 (three) times a day as needed (headache) 15 tablet 0    [DISCONTINUED] fexofenadine (ALLEGRA ALLERGY) 180 MG tablet Take 180 mg by mouth daily        [DISCONTINUED] Galcanezumab-gnlm (EMGALITY) 120 MG/ML SOAJ First month loading dose:  2 injections 2-3 hours apart  Then dose is one injection every 30 days  (Patient not taking: Reported on 7/30/2019) 4 pen 0     No current facility-administered medications on file prior to visit  Review of Systems   Refer to positive review of systems in HPI  Review of Systems   Neurological: Positive for headaches  All other systems reviewed and are negative            Vitals:    07/30/19 1156   BP: 116/85   BP Location: Left arm   Patient Position: Sitting   Cuff Size: Adult   Pulse: 94   Weight: 115 kg (253 lb)   Height: 5' 5" (1 651 m)       PHYSICAL EXAM:  Appearance: No Acute Distress  Ophthalmoscopic: Disc Flat, Normal fundus  Mental status:  Orientation: Awake, Alert, and Orientedx3  Memory: Registation 3/3 Recall 3/3  Attention: normal  Knowledge: good  Language: No aphasia  Speech: No dysarthria  Cranial Nerves:  2 No Visual Defect on Confrontation, Pupils round, equal, reactive to light  3,4,6 Extraocular Movements Intact, no nystagmus  5 Facial Sensation Intact  7 No facial asymmetry  8 Intact hearing  9,10 Palate symmetric, normal gag  11 Good shoulder shrug  12 Tongue Midline  Gait: Stable  Coordination: No ataxia with finger to nose testing, and heel to shin  Sensory: Intact, Symmetric to pinprick, light touch, vibration, and joint position  Muscle Tone: Normal              Muscle exam:  Arm Right Left Leg Right Left   Deltoid 5/5 5/5 Iliopsoas 5/5 5/5   Biceps 5/5 5/5 Quads 5/5 5/5   Triceps 5/5 5/5 Hamstrings 5/5 5/5   Wrist Extension 5/5 5/5 Ankle Dorsi Flexion 5/5 5/5   Wrist Flexion 5/5 5/5 Ankle Plantar Flexion 5/5 5/5   Interossei 5/5 5/5 Ankle Eversion 5/5 5/5   APB 5/5 5/5 Ankle Inversion 5/5 5/5       Reflexes   RJ BJ TJ KJ AJ Plantars Santiago's   Right 2+ 2+ 2+ 2+ 2+ Downgoing Not present   Left 2+ 2+ 2+ 2+ 2+ Downgoing Not present     Personal review of  Labs:                    Diagnoses and all orders for this visit:        1  Intractable migraine with aura without status migrainosus  Galcanezumab-gnlm (EMGALITY) 120 MG/ML SOAJ     Will try to get Emgality approved  She uses prn toradol, imitrex or fioricet  Will resume topamax and verapamil for prevention  If she's not better with emgality then will need to consider botox  Total time of encounter:  30 min  More than 50% of the time was used in counseling and/or coordination of care  Extent of counseling and/or coordination of care        Alec Lema MD  [facility]  [address]  [place]   [phone]

## 2019-07-31 ENCOUNTER — TELEPHONE (OUTPATIENT)
Dept: NEUROLOGY | Facility: CLINIC | Age: 29
End: 2019-07-31

## 2019-07-31 NOTE — TELEPHONE ENCOUNTER
I called [facility], and her insurance requires a PA for [facility]  I called [facility] and initiated the PA  Awaiting response

## 2019-07-31 NOTE — TELEPHONE ENCOUNTER
----- Message from Micheal Brown MD sent at 7/30/2019 12:23 PM EDT -----  Can you look into see what has happened to her Emgality?

## 2019-08-06 DIAGNOSIS — G43.101 MIGRAINE WITH AURA AND WITH STATUS MIGRAINOSUS, NOT INTRACTABLE: ICD-10-CM

## 2019-08-06 DIAGNOSIS — G43.109 MIGRAINE WITH AURA AND WITHOUT STATUS MIGRAINOSUS, NOT INTRACTABLE: ICD-10-CM

## 2019-08-06 RX ORDER — SUMATRIPTAN 100 MG/1
100 TABLET, FILM COATED ORAL SEE ADMIN INSTRUCTIONS
Qty: 9 TABLET | Refills: 0 | Status: SHIPPED | OUTPATIENT
Start: 2019-08-06 | End: 2019-10-30 | Stop reason: ALTCHOICE

## 2019-08-06 RX ORDER — VERAPAMIL HYDROCHLORIDE 120 MG/1
120 CAPSULE, EXTENDED RELEASE ORAL 2 TIMES DAILY
Qty: 60 CAPSULE | Refills: 4 | Status: SHIPPED | OUTPATIENT
Start: 2019-08-06 | End: 2019-10-30 | Stop reason: SDUPTHER

## 2019-08-06 RX ORDER — BUTALBITAL, ACETAMINOPHEN AND CAFFEINE 50; 325; 40 MG/1; MG/1; MG/1
1 TABLET ORAL DAILY PRN
Qty: 30 TABLET | Refills: 0 | Status: SHIPPED | OUTPATIENT
Start: 2019-08-06 | End: 2019-10-30 | Stop reason: SDUPTHER

## 2019-08-06 RX ORDER — TOPIRAMATE 100 MG/1
100 TABLET, FILM COATED ORAL
Qty: 30 TABLET | Refills: 4 | Status: SHIPPED | OUTPATIENT
Start: 2019-08-06 | End: 2019-10-30 | Stop reason: SDUPTHER

## 2019-08-06 NOTE — TELEPHONE ENCOUNTER
Patient called in on refill line for medication Fioricet -40 MG, Imitrex 100 MG, Topamax 100 MG, Verelan  [facility]  Pharm[facility]

## 2019-08-27 DIAGNOSIS — G43.109 MIGRAINE WITH AURA AND WITHOUT STATUS MIGRAINOSUS, NOT INTRACTABLE: ICD-10-CM

## 2019-08-27 RX ORDER — NAPROXEN 500 MG/1
TABLET ORAL
Qty: 30 TABLET | Refills: 0 | Status: SHIPPED | OUTPATIENT
Start: 2019-08-27 | End: 2019-10-01 | Stop reason: SDUPTHER

## 2019-10-01 DIAGNOSIS — G43.109 MIGRAINE WITH AURA AND WITHOUT STATUS MIGRAINOSUS, NOT INTRACTABLE: ICD-10-CM

## 2019-10-01 RX ORDER — NAPROXEN 500 MG/1
TABLET ORAL
Qty: 30 TABLET | Refills: 0 | Status: SHIPPED | OUTPATIENT
Start: 2019-10-01 | End: 2020-03-25 | Stop reason: SDUPTHER

## 2019-10-30 ENCOUNTER — OFFICE VISIT (OUTPATIENT)
Dept: NEUROLOGY | Facility: CLINIC | Age: 29
End: 2019-10-30
Payer: COMMERCIAL

## 2019-10-30 VITALS
HEIGHT: 65 IN | DIASTOLIC BLOOD PRESSURE: 92 MMHG | SYSTOLIC BLOOD PRESSURE: 128 MMHG | BODY MASS INDEX: 42.1 KG/M2 | HEART RATE: 87 BPM

## 2019-10-30 DIAGNOSIS — G43.109 MIGRAINE WITH AURA AND WITHOUT STATUS MIGRAINOSUS, NOT INTRACTABLE: ICD-10-CM

## 2019-10-30 DIAGNOSIS — G43.119 INTRACTABLE MIGRAINE WITH AURA WITHOUT STATUS MIGRAINOSUS: Primary | ICD-10-CM

## 2019-10-30 DIAGNOSIS — G43.101 MIGRAINE WITH AURA AND WITH STATUS MIGRAINOSUS, NOT INTRACTABLE: ICD-10-CM

## 2019-10-30 PROCEDURE — 99214 OFFICE O/P EST MOD 30 MIN: CPT | Performed by: PSYCHIATRY & NEUROLOGY

## 2019-10-30 RX ORDER — BUTALBITAL, ACETAMINOPHEN AND CAFFEINE 50; 325; 40 MG/1; MG/1; MG/1
1 TABLET ORAL DAILY PRN
Qty: 30 TABLET | Refills: 0 | Status: SHIPPED | OUTPATIENT
Start: 2019-10-30 | End: 2020-03-25 | Stop reason: SDUPTHER

## 2019-10-30 RX ORDER — TOPIRAMATE 100 MG/1
100 TABLET, FILM COATED ORAL
Qty: 30 TABLET | Refills: 4 | Status: SHIPPED | OUTPATIENT
Start: 2019-10-30 | End: 2020-03-25 | Stop reason: SDUPTHER

## 2019-10-30 RX ORDER — VERAPAMIL HYDROCHLORIDE 120 MG/1
120 CAPSULE, EXTENDED RELEASE ORAL 2 TIMES DAILY
Qty: 60 CAPSULE | Refills: 4 | Status: SHIPPED | OUTPATIENT
Start: 2019-10-30 | End: 2020-03-25 | Stop reason: SDUPTHER

## 2019-10-30 NOTE — PROGRESS NOTES
Return NeuroOutpatient Note        Yuri Butterfield  [id number]  79 y o   1990       Migraine        History obtained from:  Patient     HPI/Subjective:  Ms Oni Krishna is a 31 yo F with h/o migraines since childhood presents as follow up  Patient is a former patient of Dr Jb Joseph  She was getting 2 migraines a month but some months she can get upto 4 a month depending on weather change, humidity level  Abortive ones do help but she may feel odd sensation in the beginning for few minutes  Due to weight gain, she wanted to come off of elavil so we had tapered it  She's still on verapamil 120mg bid and topamax 100mg daily  She doesn't remember being tried on propranolol  Previously in past few months she had reported 10-15 noticeable headaches a month  Patient has tried 2 months of Aimovig  After that her insurance didn't help  It had helped  It did however gave her GI side effects  She was feeling nauseous with it  Due to side effects with Aimovig, we had switched her to [facility]  Patient has now been on Emgality for past 3 months  Prior to [facility], patient was getting 6 noticeable headaches  In month of sept she had 6 but not as intense  In month of October she only had 2 headaches which is an improvement       Past Medical History:   Diagnosis Date    H/O anxiety disorder     H/O: depression     Migraine with aura     Seasonal allergies      Social History     Socioeconomic History    Marital status: Single     Spouse name: Not on file    Number of children: Not on file    Years of education: Not on file    Highest education level: Not on file   Occupational History    Not on file   Social Needs    Financial resource strain: Not on file    Food insecurity:     Worry: Not on file     Inability: Not on file    Transportation needs:     Medical: Not on file     Non-medical: Not on file   Tobacco Use    Smoking status: Never Smoker    Smokeless tobacco: Never Used   Substance and Sexual Activity  Alcohol use: Yes     Comment: rarely    Drug use: No    Sexual activity: Not on file   Lifestyle    Physical activity:     Days per week: Not on file     Minutes per session: Not on file    Stress: Not on file   Relationships    Social connections:     Talks on phone: Not on file     Gets together: Not on file     Attends Zoroastrianism service: Not on file     Active member of club or organization: Not on file     Attends meetings of clubs or organizations: Not on file     Relationship status: Not on file    Intimate partner violence:     Fear of current or ex partner: Not on file     Emotionally abused: Not on file     Physically abused: Not on file     Forced sexual activity: Not on file   Other Topics Concern    Not on file   Social History Narrative    Not on file     Family History   Problem Relation Age of Onset    Migraines Mother     Seasonal affective disorder Father     Anxiety disorder Sister     Migraines Maternal Aunt     Migraines Maternal Grandmother      Allergies   Allergen Reactions    Chocolate Headache    Other Headache     Annotation - [id number]: NITRATES- WINE     Current Outpatient Medications on File Prior to Visit   Medication Sig Dispense Refill    buPROPion (WELLBUTRIN XL) 300 mg 24 hr tablet TK 1 T PO QHS  1    cetirizine (ZyrTEC) 10 mg tablet Take 10 mg by mouth daily      Galcanezumab-gnlm (EMGALITY) 120 MG/ML SOAJ First month loading dose:  2 injections 2-3 hours apart  Then dose is one injection every 30 days   2 pen 2    ketorolac (TORADOL) 10 mg tablet Take 1 tablet (10 mg total) by mouth every 8 (eight) hours as needed for moderate pain (headache) 20 tablet 0    naproxen (NAPROSYN) 500 mg tablet TAKE 1 TABLET BY MOUTH AS NEEDED FOR MILD- MODERATE PAIN(USE WITH IMITREX) 30 tablet 0    ondansetron (ZOFRAN-ODT) 4 mg disintegrating tablet DIS 1 TO 2 TS ON THE TONGUE Q 8 H PRN  2    [DISCONTINUED] butalbital-acetaminophen-caffeine (FIORICET,ESGIC) -40 mg per tablet Take 1 tablet by mouth daily as needed for migraine 30 tablet 0    [DISCONTINUED] SUMAtriptan (IMITREX) 100 mg tablet Take 1 tablet (100 mg total) by mouth see administration instructions At onset of Migraine 9 tablet 0    [DISCONTINUED] topiramate (TOPAMAX) 100 mg tablet Take 1 tablet (100 mg total) by mouth daily at bedtime Starting April 19th 30 tablet 4    [DISCONTINUED] verapamil (VERELAN PM) 120 MG 24 hr capsule Take 1 capsule (120 mg total) by mouth 2 (two) times a day 60 capsule 4    amitriptyline (ELAVIL) 25 mg tablet On April 15th, decrease elavil to 50mg nightly and in another 2 weeks take 25mg at bed time  (Patient not taking: Reported on 10/24/2018 ) 60 tablet 2    diphenhydrAMINE (BENADRYL) 25 mg tablet Take 1 tablet (25 mg total) by mouth every 8 (eight) hours as needed (migraine) (Patient not taking: Reported on 7/30/2019) 15 tablet 0    LORazepam (ATIVAN) 0 5 mg tablet TK 1 T PO  BID PRN  0    Methylprednisolone 4 MG TBPK Use as directed on package (Patient not taking: Reported on 10/24/2018 ) 21 tablet 0    prochlorperazine (COMPAZINE) 10 mg tablet Take 1 tablet (10 mg total) by mouth every 8 (eight) hours as needed for nausea or vomiting (migraine) With diclofenac sodium and benadryl (Patient not taking: Reported on 10/30/2019) 15 tablet 0    [DISCONTINUED] diclofenac (VOLTAREN) 75 mg EC tablet Take 1 tablet (75 mg total) by mouth 3 (three) times a day as needed (headache) 15 tablet 0     No current facility-administered medications on file prior to visit  Review of Systems   Refer to positive review of systems in HPI  Review of Systems   Neurological: Positive for headaches  All other systems reviewed and are negative            Vitals:    10/30/19 1534   BP: 128/92   BP Location: Left arm   Patient Position: Sitting   Cuff Size: Adult   Pulse: 87   Height: 5' 5" (1 651 m)       PHYSICAL EXAM:  Appearance: No Acute Distress  Ophthalmoscopic: Disc Flat, Normal fundus  Mental status:  Orientation: Awake, Alert, and Orientedx3  Memory: Registation 3/3 Recall 3/3  Attention: normal  Knowledge: good  Language: No aphasia  Speech: No dysarthria  Cranial Nerves:  2 No Visual Defect on Confrontation, Pupils round, equal, reactive to light  3,4,6 Extraocular Movements Intact, no nystagmus  5 Facial Sensation Intact  7 No facial asymmetry  8 Intact hearing  9,10 Palate symmetric, normal gag  11 Good shoulder shrug  12 Tongue Midline  Gait: Stable  Coordination: No ataxia with finger to nose testing, and heel to shin  Sensory: Intact, Symmetric to pinprick, light touch, vibration, and joint position  Muscle Tone: Normal              Muscle exam:  Arm Right Left Leg Right Left   Deltoid 5/5 5/5 Iliopsoas 5/5 5/5   Biceps 5/5 5/5 Quads 5/5 5/5   Triceps 5/5 5/5 Hamstrings 5/5 5/5   Wrist Extension 5/5 5/5 Ankle Dorsi Flexion 5/5 5/5   Wrist Flexion 5/5 5/5 Ankle Plantar Flexion 5/5 5/5   Interossei 5/5 5/5 Ankle Eversion 5/5 5/5   APB 5/5 5/5 Ankle Inversion 5/5 5/5       Reflexes   RJ BJ TJ KJ AJ Plantars Santiago's   Right 2+ 2+ 2+ 2+ 2+ Downgoing Not present   Left 2+ 2+ 2+ 2+ 2+ Downgoing Not present     Personal review of  Labs:                    Diagnoses and all orders for this visit:        1  Intractable migraine with aura without status migrainosus  SUMAtriptan Succinate 6 MG/0 5ML SOTJ   2  Migraine with aura and without status migrainosus, not intractable  SUMAtriptan Succinate 6 MG/0 5ML SOTJ    topiramate (TOPAMAX) 100 mg tablet    butalbital-acetaminophen-caffeine (FIORICET,ESGIC) -40 mg per tablet    verapamil (VERELAN PM) 120 MG 24 hr capsule   3  Migraine with aura and with status migrainosus, not intractable  SUMAtriptan Succinate 6 MG/0 5ML SOTJ    butalbital-acetaminophen-caffeine (FIORICET,ESGIC) -40 mg per tablet     She is responding to [facility] well  She is tolerating this much better     She may resume prior topamax and verapamil at this time however would consider reducing them in future if she continues to do well with Emgality  She uses prn toradol, sumavel injection, or fioricet  Total time of encounter:  30 min  More than 50% of the time was used in counseling and/or coordination of care  Extent of counseling and/or coordination of care        MD Hardeep Duarte [facility]  [id number] [place] [address]  [phone]

## 2019-11-20 ENCOUNTER — TELEPHONE (OUTPATIENT)
Dept: NEUROLOGY | Facility: CLINIC | Age: 29
End: 2019-11-20

## 2019-11-20 NOTE — TELEPHONE ENCOUNTER
Fax received from pharmacy requesting PA for emgality  PA submitted through [facility]  Key: [id number]  Awaiting determination

## 2020-01-30 DIAGNOSIS — G43.119 INTRACTABLE MIGRAINE WITH AURA WITHOUT STATUS MIGRAINOSUS: ICD-10-CM

## 2020-01-30 RX ORDER — GALCANEZUMAB 120 MG/ML
INJECTION, SOLUTION SUBCUTANEOUS
Qty: 2 ML | Refills: 0 | Status: SHIPPED | OUTPATIENT
Start: 2020-01-30 | End: 2020-04-02

## 2020-03-18 ENCOUNTER — TELEPHONE (OUTPATIENT)
Dept: NEUROLOGY | Facility: CLINIC | Age: 30
End: 2020-03-18

## 2020-03-18 NOTE — LETTER
March 18, 2020         Patient: Zuleika Nephew   YOB: 1990           To Whom It May Concern:     Marcial Hopson is under my professional care  Please excuse her from work from 3/10/20 through 3/13/20 due to a neurological condition  If any questions, please call our office at [phone]           Sincerely,     Susanna Macias MD

## 2020-03-18 NOTE — TELEPHONE ENCOUNTER
Patient reports she missed work from 3/10- 3/13 due to a migraine  She is asking for a note for work  Okay to generate? "Hiwot Loya is under my professional care  Please excuse her from work from 3/10/20 through 3/13/20 due to a neurological condition "    She would like this faxed to [phone] attn: Hiwot Loya      [phone]  Okay to leave detailed message

## 2020-03-25 ENCOUNTER — TELEMEDICINE (OUTPATIENT)
Dept: NEUROLOGY | Facility: CLINIC | Age: 30
End: 2020-03-25
Payer: COMMERCIAL

## 2020-03-25 DIAGNOSIS — G43.101 MIGRAINE WITH AURA AND WITH STATUS MIGRAINOSUS, NOT INTRACTABLE: ICD-10-CM

## 2020-03-25 DIAGNOSIS — R51.9 PERSISTENT HEADACHES: Primary | ICD-10-CM

## 2020-03-25 DIAGNOSIS — G43.109 MIGRAINE WITH AURA AND WITHOUT STATUS MIGRAINOSUS, NOT INTRACTABLE: ICD-10-CM

## 2020-03-25 DIAGNOSIS — G43.719 INTRACTABLE CHRONIC MIGRAINE WITHOUT AURA AND WITHOUT STATUS MIGRAINOSUS: ICD-10-CM

## 2020-03-25 PROCEDURE — 99213 OFFICE O/P EST LOW 20 MIN: CPT | Performed by: PSYCHIATRY & NEUROLOGY

## 2020-03-25 RX ORDER — BUTALBITAL, ACETAMINOPHEN AND CAFFEINE 50; 325; 40 MG/1; MG/1; MG/1
1 TABLET ORAL DAILY PRN
Qty: 20 TABLET | Refills: 0 | Status: SHIPPED | OUTPATIENT
Start: 2020-03-25 | End: 2020-12-31 | Stop reason: SDUPTHER

## 2020-03-25 RX ORDER — VERAPAMIL HYDROCHLORIDE 120 MG/1
120 CAPSULE, EXTENDED RELEASE ORAL 2 TIMES DAILY
Qty: 60 CAPSULE | Refills: 4 | Status: SHIPPED | OUTPATIENT
Start: 2020-03-25 | End: 2020-11-01

## 2020-03-25 RX ORDER — NAPROXEN 500 MG/1
500 TABLET ORAL AS NEEDED
Qty: 20 TABLET | Refills: 0 | Status: SHIPPED | OUTPATIENT
Start: 2020-03-25

## 2020-03-25 RX ORDER — TOPIRAMATE 100 MG/1
100 TABLET, FILM COATED ORAL 2 TIMES DAILY
Qty: 60 TABLET | Refills: 4 | Status: SHIPPED | OUTPATIENT
Start: 2020-03-25 | End: 2020-11-01

## 2020-03-25 NOTE — PROGRESS NOTES
Virtual Regular Visit    Problem List Items Addressed This Visit     None      Visit Diagnoses     Persistent headaches    -  Primary    Relevant Orders    MRI brain with and without contrast    Intractable chronic migraine without aura and without status migrainosus        Relevant Medications    topiramate (TOPAMAX) 100 mg tablet    verapamil (VERELAN PM) 120 MG 24 hr capsule    Migraine with aura and without status migrainosus, not intractable        Relevant Medications    topiramate (TOPAMAX) 100 mg tablet    verapamil (VERELAN PM) 120 MG 24 hr capsule               Reason for visit is migraines     Encounter provider Susanna Macias MD    Provider located at [address] [place] [address] [place] [address] [phone]      Recent Visits  No visits were found meeting these conditions  Showing recent visits within past 7 days and meeting all other requirements     Future Appointments  No visits were found meeting these conditions  Showing future appointments within next 150 days and meeting all other requirements        After connecting through [place], the patient was identified by name and date of birth  Zuleika Elizabeth was informed that this is a telemedicine visit and that the visit is being conducted through [employer] which may not be secure and therefore, might not be HIPAA-compliant  Other methods to assure confidentiality were taken   Computer and phone are away from patient access  The following individuals were in the room with me and the patient informed NP, Jorge Aaron  She acknowledged consent and understanding of privacy and security of the video platform  The patient has agreed to participate and understands they can discontinue the visit at any time  Subjective  Zuleika Elizabeth is a 30 y o  female  with h/o migraines since childhood presents as follow up  Patient is a former patient of Dr Melissa Pope   They started when she was in 2nd grade   She was getting 2 migraines a month but some months she can get upto 4 a month depending on weather change, humidity level  Abortive ones do help but she may feel odd sensation in the beginning for few minutes  Due to weight gain, she wanted to come off of elavil so we had tapered it  She's still on verapamil 120mg bid and topamax 100mg daily  Patient has tried 2 months of Aimovig  After that her insurance didn't help  It had helped  It did however gave her GI side effects  She was feeling nauseous with it  Due to side effects with Aimovig, we had switched her to [facility]  She is still reporting about 13-15 headaches a month  Intensity is milder but frequency hasn't changed much       Elavil was a problem for her as she was gaining weight  She would not like depakote with potential of gaining weight  Today she reports location changed to occipital region  They tend to be more constant even if she goes to sleep  Denies associated blurred vision, extremity paresthesia  Patient does have a lot of stress from school       Past Medical History:   Diagnosis Date    H/O anxiety disorder     H/O: depression     Migraine with aura     Seasonal allergies        Past Surgical History:   Procedure Laterality Date    UPPER GASTROINTESTINAL ENDOSCOPY      WISDOM TOOTH EXTRACTION         Current Outpatient Medications   Medication Sig Dispense Refill    buPROPion (WELLBUTRIN XL) 300 mg 24 hr tablet TK 1 T PO QHS  1    EMGALITY 120 MG/ML SOAJ INJECT 2 INJECTIONS 2 TO 3 HOURS APART FOR THE FIRST MONTH LOADING DOSE THEN ONE INJECTION EVERY 30 DAYS 2 mL 0    SUMAtriptan Succinate 6 MG/0 5ML SOTJ Inject 0 5 mL (6 mg total) under the skin as needed (migraine) 6 Syringe 1    topiramate (TOPAMAX) 100 mg tablet Take 1 tablet (100 mg total) by mouth 2 (two) times a day Starting April 19th 60 tablet 4    verapamil (VERELAN PM) 120 MG 24 hr capsule Take 1 capsule (120 mg total) by mouth 2 (two) times a day 60 capsule 4    amitriptyline (ELAVIL) 25 mg tablet On April 15th, decrease elavil to 50mg nightly and in another 2 weeks take 25mg at bed time  (Patient not taking: Reported on 10/24/2018 ) 60 tablet 2    butalbital-acetaminophen-caffeine (FIORICET,ESGIC) -40 mg per tablet Take 1 tablet by mouth daily as needed for migraine 30 tablet 0    cetirizine (ZyrTEC) 10 mg tablet Take 10 mg by mouth daily      diphenhydrAMINE (BENADRYL) 25 mg tablet Take 1 tablet (25 mg total) by mouth every 8 (eight) hours as needed (migraine) (Patient not taking: Reported on 7/30/2019) 15 tablet 0    ketorolac (TORADOL) 10 mg tablet Take 1 tablet (10 mg total) by mouth every 8 (eight) hours as needed for moderate pain (headache) 20 tablet 0    LORazepam (ATIVAN) 0 5 mg tablet TK 1 T PO  BID PRN  0    Methylprednisolone 4 MG TBPK Use as directed on package (Patient not taking: Reported on 10/24/2018 ) 21 tablet 0    naproxen (NAPROSYN) 500 mg tablet TAKE 1 TABLET BY MOUTH AS NEEDED FOR MILD- MODERATE PAIN(USE WITH IMITREX) 30 tablet 0    ondansetron (ZOFRAN-ODT) 4 mg disintegrating tablet DIS 1 TO 2 TS ON THE TONGUE Q 8 H PRN  2    prochlorperazine (COMPAZINE) 10 mg tablet Take 1 tablet (10 mg total) by mouth every 8 (eight) hours as needed for nausea or vomiting (migraine) With diclofenac sodium and benadryl (Patient not taking: Reported on 10/30/2019) 15 tablet 0     No current facility-administered medications for this visit  Allergies   Allergen Reactions    Chocolate Headache    Other Headache     Annotation - [id number]: NITRATES- WINE       Review of Systems   Neurological: Positive for headaches  Physical Exam   Constitutional: She is oriented to person, place, and time  She appears well-developed and well-nourished  HENT:   Head: Normocephalic  Neurological: She is alert and oriented to person, place, and time  No cranial nerve deficit   Coordination normal    Psychiatric: She has a normal mood and affect  Her behavior is normal  Judgment and thought content normal            Diagnosis ICD-10-CM Associated Orders   1  Persistent headaches R51 MRI brain with and without contrast   2  Intractable chronic migraine without aura and without status migrainosus G43 719 topiramate (TOPAMAX) 100 mg tablet   3  Migraine with aura and without status migrainosus, not intractable G43 109 topiramate (TOPAMAX) 100 mg tablet     verapamil (VERELAN PM) 120 MG 24 hr capsule     butalbital-acetaminophen-caffeine (FIORICET,ESGIC) -40 mg per tablet     naproxen (NAPROSYN) 500 mg tablet   4  Migraine with aura and with status migrainosus, not intractable G43 101 butalbital-acetaminophen-caffeine (FIORICET,ESGIC) -40 mg per tablet       Patient's headaches are still not well controlled despite being on 2 preventive oral and one preventive injectable agent  Will increase topamax to 100mg bid if tolerated  She may resume verapamil  She is to resume Emgality 120mg every 30 days  There is now change in location of her headache and some characteristics  Will get one time MRI brain to r/o structural process including changes that can be seen with pseudotumor cerebri  Will start paper work for botox  I spent 35 minutes with the patient during this visit

## 2020-03-26 ENCOUNTER — TELEPHONE (OUTPATIENT)
Dept: NEUROLOGY | Facility: CLINIC | Age: 30
End: 2020-03-26

## 2020-03-26 NOTE — TELEPHONE ENCOUNTER
----- Message from Tori Miller sent at 3/26/2020 11:24 AM EDT -----  Regarding: New Start Botox  Good Morning,    Dr Silvino Hdz has a new start Botox for chronic migraine, 200 units  Thank you

## 2020-03-27 NOTE — TELEPHONE ENCOUNTER
Received a voicemail from [facility] requesting clinical notes to be faxed to [phone]  Office note printed and faxed on 3/27/2020  Will await approval/denial letter

## 2020-03-30 ENCOUNTER — TELEPHONE (OUTPATIENT)
Dept: NEUROLOGY | Facility: CLINIC | Age: 30
End: 2020-03-30

## 2020-03-30 NOTE — TELEPHONE ENCOUNTER
Lizzeth De La Rosa, Nurse  for [facility] calling to have order for MRI faxed to [phone]  Order faxed

## 2020-03-30 NOTE — TELEPHONE ENCOUNTER
Received a call from Jesse with [facility]  He states he needs to verify additional inforamtion for the patient's Botox authorization  1  Is the patient going to be discontinuing Emgality prior to starting Botox? 2  Does the patient have at least 15 or more headaches for at least 3 months? 3  How many of these are migraines? 3-4 migraines are not enough patient must have at least 8 migraines per month for at least 3 months  4  Need to verify the specific symptoms the patient is having that would classify these as migraines  Will need to call Jesse back at [phone] so he can process this review  Dr Amanda Rosario,    Can you please answer the above questions in bold in order for me to get the approval for the patient's Botox authorization      Thank you,    Singh Robert

## 2020-03-30 NOTE — TELEPHONE ENCOUNTER
She's getting total of 13-15 headaches a month  We need to continue Emgality  Sherryle Lais, can you call her and get more specifics to see if she can quality for botox?

## 2020-04-01 NOTE — TELEPHONE ENCOUNTER
Called [facility]- spoke with Joe Tirado- I advised her I was calling to provide additional information on the patient's pending Botox authorization that was requested  She was able to transfer me over to the clinical team to provide the additional information  Spoke with Shania Jerez initial review technician- I advised him that the patient is experiencing 15 migraines per month  The migraine is severe in quality and localized over the entire head region  She experiences light and noise sensitity, nausea, vomiting, and phantom smells during the migraines  These migraines are affecting the quality of her daily living activities  He questioned if the patient has tried a Triptan therapy- I advised him that the patient has tried Imitrex and has relief with the abortive medication  He questioned if the patient would be discontinuing the Emgality prior to starting Botox  I did advise him that the physician would like to keep the patient on the [facility]  He states with the patient's policy they do not allow the patient to be on a CGRP in combination with Botox  If the provide would want to continue both the physician would need to complete a peer to peer  Dr Myron Cramer,    Per the patient's insurance policy they do not allow the patient to be on a CGRP in combination with Botox  Patient would have to be on one or the other  If you do not wish to discontinue the Emgality the Botox will be denied- a peer to peer can be completed with a physician reviewer  If you do wish to discontinue the Emgality please let me know and I can call them back and provide them with your decision      Please advise    Thank you,    Debo Sher

## 2020-04-01 NOTE — TELEPHONE ENCOUNTER
Noted- called [facility]- spoke with Ortiz- I advised her that I was calling to cancel the prior authorization that is currently pending  She transferred me to a clinical technician- Silviano Peng I advised her that the provider would like to hold off on Botox at this time  Authorization has been canceled  Will re-apply once the provider has decided to put the patient on Botox

## 2020-04-01 NOTE — TELEPHONE ENCOUNTER
I spoke to the patient  She is experiencing 15 migraines per month  Each migraine is lasting 2-3 days  The migraine is severe in quality and localized over the entire head region  She experiences light and noise sensitivity, nausea, vomiting, and phantom smells during the migraines  These migraines are affecting the quality of her daily living activities

## 2020-04-02 DIAGNOSIS — G43.119 INTRACTABLE MIGRAINE WITH AURA WITHOUT STATUS MIGRAINOSUS: ICD-10-CM

## 2020-04-02 RX ORDER — GALCANEZUMAB 120 MG/ML
INJECTION, SOLUTION SUBCUTANEOUS
Qty: 2 ML | Refills: 0 | Status: SHIPPED | OUTPATIENT
Start: 2020-04-02 | End: 2020-05-04

## 2020-04-14 ENCOUNTER — TELEPHONE (OUTPATIENT)
Dept: MRI IMAGING | Facility: HOSPITAL | Age: 30
End: 2020-04-14

## 2020-04-15 DIAGNOSIS — G43.109 MIGRAINE WITH AURA AND WITHOUT STATUS MIGRAINOSUS, NOT INTRACTABLE: ICD-10-CM

## 2020-04-15 RX ORDER — NAPROXEN 500 MG/1
TABLET ORAL
Qty: 20 TABLET | Refills: 0 | OUTPATIENT
Start: 2020-04-15

## 2020-05-02 DIAGNOSIS — G43.119 INTRACTABLE MIGRAINE WITH AURA WITHOUT STATUS MIGRAINOSUS: ICD-10-CM

## 2020-05-04 RX ORDER — GALCANEZUMAB 120 MG/ML
INJECTION, SOLUTION SUBCUTANEOUS
Qty: 2 ML | Refills: 0 | Status: SHIPPED | OUTPATIENT
Start: 2020-05-04 | End: 2020-06-09

## 2020-06-09 DIAGNOSIS — G43.119 INTRACTABLE MIGRAINE WITH AURA WITHOUT STATUS MIGRAINOSUS: ICD-10-CM

## 2020-06-09 RX ORDER — GALCANEZUMAB 120 MG/ML
INJECTION, SOLUTION SUBCUTANEOUS
Qty: 2 ML | Refills: 0 | Status: SHIPPED | OUTPATIENT
Start: 2020-06-09 | End: 2020-06-24 | Stop reason: SDUPTHER

## 2020-06-24 DIAGNOSIS — G43.119 INTRACTABLE MIGRAINE WITH AURA WITHOUT STATUS MIGRAINOSUS: ICD-10-CM

## 2020-10-31 DIAGNOSIS — G43.719 INTRACTABLE CHRONIC MIGRAINE WITHOUT AURA AND WITHOUT STATUS MIGRAINOSUS: ICD-10-CM

## 2020-10-31 DIAGNOSIS — G43.109 MIGRAINE WITH AURA AND WITHOUT STATUS MIGRAINOSUS, NOT INTRACTABLE: ICD-10-CM

## 2020-11-01 RX ORDER — TOPIRAMATE 100 MG/1
TABLET, FILM COATED ORAL
Qty: 60 TABLET | Refills: 4 | Status: SHIPPED | OUTPATIENT
Start: 2020-11-01 | End: 2020-12-31 | Stop reason: SDUPTHER

## 2020-11-01 RX ORDER — VERAPAMIL HYDROCHLORIDE 120 MG/1
CAPSULE, EXTENDED RELEASE ORAL
Qty: 60 CAPSULE | Refills: 4 | Status: SHIPPED | OUTPATIENT
Start: 2020-11-01 | End: 2020-12-31 | Stop reason: SDUPTHER

## 2020-12-31 ENCOUNTER — OFFICE VISIT (OUTPATIENT)
Dept: NEUROLOGY | Facility: CLINIC | Age: 30
End: 2020-12-31
Payer: COMMERCIAL

## 2020-12-31 VITALS
HEIGHT: 65 IN | SYSTOLIC BLOOD PRESSURE: 102 MMHG | BODY MASS INDEX: 43.49 KG/M2 | DIASTOLIC BLOOD PRESSURE: 74 MMHG | HEART RATE: 86 BPM | TEMPERATURE: 97.1 F | WEIGHT: 261 LBS

## 2020-12-31 DIAGNOSIS — G43.109 MIGRAINE WITH AURA AND WITHOUT STATUS MIGRAINOSUS, NOT INTRACTABLE: ICD-10-CM

## 2020-12-31 DIAGNOSIS — G43.101 MIGRAINE WITH AURA AND WITH STATUS MIGRAINOSUS, NOT INTRACTABLE: ICD-10-CM

## 2020-12-31 DIAGNOSIS — G43.719 INTRACTABLE CHRONIC MIGRAINE WITHOUT AURA AND WITHOUT STATUS MIGRAINOSUS: ICD-10-CM

## 2020-12-31 DIAGNOSIS — G43.709 CHRONIC MIGRAINE WITHOUT AURA WITHOUT STATUS MIGRAINOSUS, NOT INTRACTABLE: Primary | ICD-10-CM

## 2020-12-31 PROCEDURE — 99214 OFFICE O/P EST MOD 30 MIN: CPT | Performed by: PSYCHIATRY & NEUROLOGY

## 2020-12-31 RX ORDER — VERAPAMIL HYDROCHLORIDE 120 MG/1
120 CAPSULE, EXTENDED RELEASE ORAL 2 TIMES DAILY
Qty: 60 CAPSULE | Refills: 4 | Status: SHIPPED | OUTPATIENT
Start: 2020-12-31

## 2020-12-31 RX ORDER — TOPIRAMATE 100 MG/1
100 TABLET, FILM COATED ORAL 2 TIMES DAILY
Qty: 60 TABLET | Refills: 4 | Status: SHIPPED | OUTPATIENT
Start: 2020-12-31 | End: 2022-08-08

## 2020-12-31 RX ORDER — BUTALBITAL, ACETAMINOPHEN AND CAFFEINE 50; 325; 40 MG/1; MG/1; MG/1
1 TABLET ORAL DAILY PRN
Qty: 20 TABLET | Refills: 0 | Status: SHIPPED | OUTPATIENT
Start: 2020-12-31 | End: 2021-09-09 | Stop reason: SDUPTHER

## 2021-01-11 ENCOUNTER — DOCUMENTATION (OUTPATIENT)
Dept: NEUROLOGY | Facility: CLINIC | Age: 31
End: 2021-01-11

## 2021-01-11 NOTE — PROGRESS NOTES
Dr Uriel Brito would like to start Botox authorization for this patient for chronic migraine, 200 units  The patient will be discontinuing Emgality  Thank you

## 2021-01-12 NOTE — PROGRESS NOTES
[place],    [id number]- authorization should not be needed- but I would just call and confirm with Raffi to  Verify this

## 2021-01-13 NOTE — PROGRESS NOTES
Called [facility], spoke with Luzma Grullon, advised I was calling to inquire if prior authorization is needed for CPT coded 05227 and Lula Soto states that as of 4/24/2020 no authorization is required for either code  Olivia or Deyanira, please reach out to patient to schedule New Start Botox appointment with Dr Aguirre Person  Medication will be obtained from specialty pharmacy, please schedule at least 3-4 weeks out to ensure we will have time to order medication from specialty pharmacy  Please advise when patient is scheduled so we can begin ordering medication

## 2021-01-18 NOTE — PROGRESS NOTES
Patient returned call, today is her last day of work and she will be losing her health insurance so is not interested in scheduling for botox at this time

## 2021-01-18 NOTE — PROGRESS NOTES
L/m asking for a c/b to schedule New Start Botox appt   Advised that Dr Manasa Saldivar next Botox days are 3/1 & 3/8 so she can be scheduled for one of those days

## 2021-06-03 ENCOUNTER — OFFICE VISIT (OUTPATIENT)
Dept: NEUROLOGY | Facility: CLINIC | Age: 31
End: 2021-06-03
Payer: COMMERCIAL

## 2021-06-03 VITALS
HEIGHT: 65 IN | WEIGHT: 266 LBS | HEART RATE: 85 BPM | DIASTOLIC BLOOD PRESSURE: 73 MMHG | SYSTOLIC BLOOD PRESSURE: 114 MMHG | BODY MASS INDEX: 44.32 KG/M2

## 2021-06-03 DIAGNOSIS — G43.719 INTRACTABLE CHRONIC MIGRAINE WITHOUT AURA AND WITHOUT STATUS MIGRAINOSUS: Primary | ICD-10-CM

## 2021-06-03 PROCEDURE — 99214 OFFICE O/P EST MOD 30 MIN: CPT | Performed by: PSYCHIATRY & NEUROLOGY

## 2021-06-03 NOTE — PROGRESS NOTES
Return NeuroOutpatient Note        Sharon Schmitz  [id number]  66 y o   1990       Migraine (~20/month)        History obtained from:  Patient     HPI/Subjective:    Sharon Schmitz is a 29 yo F with PMH of migraines presents as f/u  Per my previous history, patient has h/o migraines since childhood  Patient is a former patient of Dr Guera Dodd  Tracey Grimm was getting 2 migraines a month but some months she can get upto 4 a month depending on weather change, humidity level  Abortive ones do help but she may feel odd sensation in the beginning for few minutes  Due to weight gain, she wanted to come off of elavil so we had tapered it  She's still on verapamil 120mg bid and topamax 100mg daily   since coming off of elavil, patient has had slow increase in migraines  We had introduced Emgality and she was on it for 2 years  It was helping with intensity but not frequency of headaches  We had previously discussed that with her insurance we could only use either Emgality or botox  She preferred to try botox  Now with new insurance she would like to get this approved  Patient currently gets about 20 headaches a month  With elavil she was feeling tired and had thought it was contributing to weight gain      Patient had MRI brain in March 2020 at [facility]             Past Medical History:   Diagnosis Date    H/O anxiety disorder     H/O: depression     Migraine with aura     Seasonal allergies      Social History     Socioeconomic History    Marital status: Single     Spouse name: Not on file    Number of children: Not on file    Years of education: Not on file    Highest education level: Not on file   Occupational History    Not on file   Social Needs    Financial resource strain: Not on file    Food insecurity     Worry: Not on file     Inability: Not on file    Transportation needs     Medical: Not on file     Non-medical: Not on file   Tobacco Use    Smoking status: Never Smoker    Smokeless tobacco: Never Used   Substance and Sexual Activity    Alcohol use: Yes     Comment: rarely    Drug use: No    Sexual activity: Not on file   Lifestyle    Physical activity     Days per week: Not on file     Minutes per session: Not on file    Stress: Not on file   Relationships    Social connections     Talks on phone: Not on file     Gets together: Not on file     Attends Anglican service: Not on file     Active member of club or organization: Not on file     Attends meetings of clubs or organizations: Not on file     Relationship status: Not on file    Intimate partner violence     Fear of current or ex partner: Not on file     Emotionally abused: Not on file     Physically abused: Not on file     Forced sexual activity: Not on file   Other Topics Concern    Not on file   Social History Narrative    Not on file     Family History   Problem Relation Age of Onset    Migraines Mother     Seasonal affective disorder Father     Anxiety disorder Sister     Migraines Maternal Aunt     Migraines Maternal Grandmother      Allergies   Allergen Reactions    Chocolate - Food Allergy Headache    Other Headache     Annotation - [id number]: NITRATES- WINE     Current Outpatient Medications on File Prior to Visit   Medication Sig Dispense Refill    buPROPion (WELLBUTRIN XL) 300 mg 24 hr tablet TK 1 T PO QHS  1    butalbital-acetaminophen-caffeine (FIORICET,ESGIC) -40 mg per tablet Take 1 tablet by mouth daily as needed for migraine 20 tablet 0    naproxen (NAPROSYN) 500 mg tablet Take 1 tablet (500 mg total) by mouth as needed for moderate pain (migraines) 20 tablet 0    topiramate (TOPAMAX) 100 mg tablet Take 1 tablet (100 mg total) by mouth 2 (two) times a day 60 tablet 4    verapamil (VERELAN PM) 120 MG 24 hr capsule Take 1 capsule (120 mg total) by mouth 2 (two) times a day 60 capsule 4    cetirizine (ZyrTEC) 10 mg tablet Take 10 mg by mouth daily      Galcanezumab-gnlm (Emgality) 120 MG/ML SOAJ Inject 1 pen under the skin every 30 (thirty) days (Patient not taking: Reported on 6/3/2021) 1 mL 5    LORazepam (ATIVAN) 0 5 mg tablet TK 1 T PO  BID PRN  0    ondansetron (ZOFRAN-ODT) 4 mg disintegrating tablet DIS 1 TO 2 TS ON THE TONGUE Q 8 H PRN  2    [DISCONTINUED] amitriptyline (ELAVIL) 25 mg tablet On April 15th, decrease elavil to 50mg nightly and in another 2 weeks take 25mg at bed time  (Patient not taking: Reported on 10/24/2018 ) 60 tablet 2    [DISCONTINUED] diclofenac (VOLTAREN) 75 mg EC tablet Take 1 tablet (75 mg total) by mouth 3 (three) times a day as needed (headache) 15 tablet 0     No current facility-administered medications on file prior to visit  Review of Systems   Refer to positive review of systems in HPI     Review of Systems    Constitutional- No fever  Eyes- No visual change  ENT- Hearing normal  CV- No chest pain  Resp- No Shortness of breath  GI- No diarrhea  - Bladder normal  MS- No Arthritis   Skin- No rash  Psych- No depression  Endo- No DM  Heme- No nodes    Vitals:    06/03/21 0929   BP: 114/73   BP Location: Left arm   Patient Position: Sitting   Cuff Size: Adult   Pulse: 85   Weight: 121 kg (266 lb)   Height: 5' 5" (1 651 m)       PHYSICAL EXAM:  Appearance: No Acute Distress  Ophthalmoscopic: Disc Flat, Normal fundus  Mental status:  Orientation: Awake, Alert, and Orientedx3  Memory: Registation 3/3 Recall 3/3  Attention: normal  Knowledge: good  Language: No aphasia  Speech: No dysarthria  Cranial Nerves:  2 No Visual Defect on Confrontation, Pupils round, equal, reactive to light  3,4,6 Extraocular Movements Intact, no nystagmus  5 Facial Sensation Intact  7 No facial asymmetry  8 Intact hearing  9,10 Palate symmetric, normal gag  11 Good shoulder shrug  12 Tongue Midline  Gait: Stable  Coordination: No ataxia with finger to nose testing, and heel to shin  Sensory: Intact, Symmetric to pinprick, light touch, vibration, and joint position  Muscle Tone: Normal              Muscle exam:  Arm Right Left Leg Right Left   Deltoid 5/5 5/5 Iliopsoas 5/5 5/5   Biceps 5/5 5/5 Quads 5/5 5/5   Triceps 5/5 5/5 Hamstrings 5/5 5/5   Wrist Extension 5/5 5/5 Ankle Dorsi Flexion 5/5 5/5   Wrist Flexion 5/5 5/5 Ankle Plantar Flexion 5/5 5/5   Interossei 5/5 5/5 Ankle Eversion 5/5 5/5   APB 5/5 5/5 Ankle Inversion 5/5 5/5       Reflexes   RJ BJ TJ KJ AJ Plantars Santiago's   Right 2+ 2+ 2+ 2+ 2+ Downgoing Not present   Left 2+ 2+ 2+ 2+ 2+ Downgoing Not present     Personal review of  Labs:                 Diagnoses and all orders for this visit:      1  Intractable chronic migraine without aura and without status migrainosus  protriptyline (VIVACTIL) 10 mg tablet       Patient is suffering from chronic migraines that are intense without Emgality  Will start paperwork for botox  Will add protriptyline to see if it causes less side effect and can help as prevention  Elavil in past had worked well for her  She is to resume verapamil, topamax for now  In future, if needed will wean her off of topamax                 Total time of encounter:  30 min  More than 50% of the time was used in counseling and/or coordination of care  Extent of counseling and/or coordination of care        MD José [facility]  [address]  [address]  [phone]

## 2021-06-07 ENCOUNTER — TELEPHONE (OUTPATIENT)
Dept: NEUROLOGY | Facility: CLINIC | Age: 31
End: 2021-06-07

## 2021-06-07 NOTE — TELEPHONE ENCOUNTER
[employer] provider services, spoke with Hayley Sayda, advised her I was calling to obtain benefit information for Botox  She provided me with the following benefit information:    91937 - no authorization needed  Covered  @ 70% after deductible   - authorization required thorough [employer]  Covered 70% after deductible has been met  Deductible: $500   met- $0  Out of pocket max: $1,500 met- $0    Provider has the option to buy and bill     Call reference #: [id number]      ----- Message from Simone Johnson sent at 6/4/2021  8:46 AM EDT -----  Good Morning,    Dr Edilma Arguello would like this patient to be approved for Botox, 200 units, for chronic migraines      Thank you, Susan Wagner

## 2021-06-07 NOTE — TELEPHONE ENCOUNTER
Botox prior authorization has been faxed to [address]  6/7/2021, will await approval/denial letter

## 2021-06-09 NOTE — TELEPHONE ENCOUNTER
Received an approval letter from [address]  with the following approval information:  Botox 200 units approved  Authorization # [id number]  Valid dates 6/14/2021 - 12/11/2021  Authorization letter scanned into media for reference    Will call patient to review benefit information and see if she would like to proceed with botox

## 2021-06-11 NOTE — TELEPHONE ENCOUNTER
Called and spoke with the patient- she is aware of her benefit information for Botox through [employer]  She is aware we have received an approval for the Botox authorization  The patient is going to call [employer] and get more information about her out of pocket costs for Botox  Once she obtains this information she will give our office a call back and advise if she would like to proceed or not

## 2021-06-17 ENCOUNTER — DOCUMENTATION (OUTPATIENT)
Dept: NEUROLOGY | Facility: CLINIC | Age: 31
End: 2021-06-17

## 2021-06-17 NOTE — TELEPHONE ENCOUNTER
Patient has been scheduled for a new start botox appt in the [address] [place] [address] office with Dr Elizabeth Sampson on 6/28/2021 at 10:00 am - our stock per Rahel Sanz

## 2021-06-17 NOTE — TELEPHONE ENCOUNTER
Spoke with patient, she is agreeable to scheduling botox and understands that any bills for her Botox medication will be from [facility], spoke with patient in detail about the botox savings program and mailed information to patient's home address  Transferred call to Stephanie Paul office and patient scheduled and appointment 6/28/2021 with Dr Hawthorne Bolds   Will attach referral

## 2021-06-17 NOTE — PROGRESS NOTES
Type Date User Summary Attachment   General 06/17/2021  9:48 AM Maylin Amin care coordination -   Note    New Start Botox - authorization # [id number] - 1st visit - 6/14/2021 - 12/11/2021   Please use our stock      Thanks  Isac Lane

## 2021-06-28 ENCOUNTER — PROCEDURE VISIT (OUTPATIENT)
Dept: NEUROLOGY | Facility: CLINIC | Age: 31
End: 2021-06-28
Payer: COMMERCIAL

## 2021-06-28 VITALS
HEIGHT: 65 IN | HEART RATE: 81 BPM | SYSTOLIC BLOOD PRESSURE: 103 MMHG | TEMPERATURE: 97.6 F | BODY MASS INDEX: 44.32 KG/M2 | WEIGHT: 266 LBS | DIASTOLIC BLOOD PRESSURE: 73 MMHG

## 2021-06-28 DIAGNOSIS — G43.719 INTRACTABLE CHRONIC MIGRAINE WITHOUT AURA AND WITHOUT STATUS MIGRAINOSUS: Primary | ICD-10-CM

## 2021-06-28 DIAGNOSIS — G43.719 INTRACTABLE CHRONIC MIGRAINE WITHOUT AURA AND WITHOUT STATUS MIGRAINOSUS: ICD-10-CM

## 2021-06-28 PROCEDURE — 64615 CHEMODENERV MUSC MIGRAINE: CPT | Performed by: PSYCHIATRY & NEUROLOGY

## 2021-06-28 NOTE — PROGRESS NOTES
Universal Protocol   Consent: Verbal consent not obtained  Written consent obtained  Consent given by: patient  Time out: Immediately prior to procedure a "time out" was called to verify the correct patient, procedure, equipment, support staff and site/side marked as required  Timeout called at: 6/28/2021 10:29 AM   Patient identity confirmed: verbally with patient        Chemodenervation     Date/Time 6/28/2021 10:29 AM     Performed by  Darell Mendieta MD     Authorized by Darell Mendieta MD        Pre-procedure details      Prepped With: Alcohol     Anesthesia  (see MAR for exact dosages): Anesthesia method:  None   Procedure details     Position:  Upright   Botox     Brand:  Botox    mL's of Botulinum Toxin:  190    Final Concentration per CC:  50 units    Needle Gauge:  30 G 2 5 inch    Medication Administration:  200 Units Botulinum Toxin Type A 200 units   Procedures     Botox Procedures: chronic headache      Indications: migraines      Date of last exam:  6/1/2021    Last date: first session    Total Units     Total units used:  190    Total units discarded:  10   Post-procedure details      Chemodenervation:  Chronic migraine    Facial Nerve Location[de-identified]  Bilateral facial nerve    Patient tolerance of procedure:   Tolerated well, no immediate complications       22 AIGDJ, 2 sites unit(s) was injected into the procerus muscle     5 unit(s) was injected into the  right  muscle     5 unit(s) was injected into the  left  muscle     10 unit(s) was injected into the  right frontalis muscle     10 unit(s) was injected into the  left frontalis muscle     5 units in center of forehead  20  unit(s) was injected into the  right temporalis muscle     20  unit(s) was injected into the  left temporalis muscle     15 unit(s) was injected into the  right occipitalis muscle     15 unit(s) was injected into the  left occipitalis muscle     15 unit(s) was injected into the  right cervical paraspinal muscle     15 unit(s) was injected into the  left cervical paraspinal muscle     20 unit(s) was injected into the  right trapezius muscle     20 unit(s) was injected into the  left trapezius muscle        A total of 190units were used  A total of 10units were discarded  Diagnosis ICD-10-CM Associated Orders   1   Intractable chronic migraine without aura and without status migrainosus  G43 719 Botulinum Toxin Type A SOLR 200 Units     Chemodenervation

## 2021-09-09 ENCOUNTER — OFFICE VISIT (OUTPATIENT)
Dept: NEUROLOGY | Facility: CLINIC | Age: 31
End: 2021-09-09
Payer: COMMERCIAL

## 2021-09-09 VITALS
HEART RATE: 107 BPM | BODY MASS INDEX: 43.82 KG/M2 | HEIGHT: 65 IN | DIASTOLIC BLOOD PRESSURE: 92 MMHG | SYSTOLIC BLOOD PRESSURE: 129 MMHG | TEMPERATURE: 96.2 F | WEIGHT: 263 LBS

## 2021-09-09 DIAGNOSIS — G43.719 INTRACTABLE CHRONIC MIGRAINE WITHOUT AURA AND WITHOUT STATUS MIGRAINOSUS: ICD-10-CM

## 2021-09-09 PROCEDURE — 99214 OFFICE O/P EST MOD 30 MIN: CPT | Performed by: PSYCHIATRY & NEUROLOGY

## 2021-09-09 RX ORDER — FLUOCINONIDE TOPICAL SOLUTION USP, 0.05% 0.5 MG/ML
SOLUTION TOPICAL
COMMUNITY
Start: 2021-07-09

## 2021-09-09 RX ORDER — BUTALBITAL, ACETAMINOPHEN AND CAFFEINE 50; 325; 40 MG/1; MG/1; MG/1
1 TABLET ORAL DAILY PRN
Qty: 20 TABLET | Refills: 0 | Status: SHIPPED | OUTPATIENT
Start: 2021-09-09 | End: 2022-01-03 | Stop reason: SDUPTHER

## 2021-09-09 NOTE — PROGRESS NOTES
Return NeuroOutpatient Note        Mariya Charles  [id number]  89 y o   1990       Headache - Recurrent or Known Dx Migraines        History obtained from:  Patient     HPI/Subjective:    Mariya Charles is a 27 yo F with PMH of migraines presents as f/u  Per my previous history, patient has h/o migraines since childhood  Patient is a former patient of Dr Anabel Williamson  When we initially started seeing her, she was getting 2 migraines a month but some months she can get upto 4 a month depending on weather change, humidity level  Abortive ones do help but she may feel odd sensation in the beginning for few minutes  Due to weight gain, she wanted to come off of elavil so we had tapered it  She's still on verapamil 120mg bid and topamax 100mg daily  Patient had reported worsening of headaches over time despite 2-3 preventives so we had decided to give botox  she had first session in end of June  Since then she's had 2-4 migraines mainly when there is a lot of rain outside  botox has helped with daily headaches  At last visit, she had reported 20+ headache a month         she has tried aimovig but it was giving GI side effects  She was then switched to [facility] for 1 year but headaches were getting worse so then it was d/c and botox paperwork was initiated  She's currently on verapamil 120mg bid, topamax 100mg bid and protriptyline 10mg qhs    She will be getting botox every 3 months       Patient had MRI brain in March 2020 at [facility] facility           Past Medical History:   Diagnosis Date    H/O anxiety disorder     H/O: depression     Migraine with aura     Seasonal allergies      Social History     Socioeconomic History    Marital status: Single     Spouse name: Not on file    Number of children: Not on file    Years of education: Not on file    Highest education level: Not on file   Occupational History    Not on file   Tobacco Use    Smoking status: Never Smoker    Smokeless tobacco: Never Used Vaping Use    Vaping Use: Never used   Substance and Sexual Activity    Alcohol use: Yes     Comment: rarely    Drug use: No    Sexual activity: Not on file   Other Topics Concern    Not on file   Social History Narrative    Not on file     Social Determinants of Health     Financial Resource Strain:     Difficulty of Paying Living Expenses:    Food Insecurity:     Worried About Running Out of Food in the Last Year:     [address] in the Last Year:    Transportation Needs:     Lack of Transportation (Medical):      Lack of Transportation (Non-Medical):    Physical Activity:     Days of Exercise per Week:     Minutes of Exercise per Session:    Stress:     Feeling of Stress :    Social Connections:     Frequency of Communication with Friends and Family:     Frequency of Social Gatherings with Friends and Family:     Attends Jewish Services:     Active Member of Clubs or Organizations:     Attends Club or Organization Meetings:     Marital Status:    Intimate Partner Violence:     Fear of Current or Ex-Partner:     Emotionally Abused:     Physically Abused:     Sexually Abused:      Family History   Problem Relation Age of Onset    Migraines Mother     Seasonal affective disorder Father     Anxiety disorder Sister     Migraines Maternal Aunt     Migraines Maternal Grandmother      Allergies   Allergen Reactions    Chocolate - Food Allergy Headache    Other Headache     Annotation - [id number]: NITRATES- WINE     Current Outpatient Medications on File Prior to Visit   Medication Sig Dispense Refill    buPROPion (WELLBUTRIN XL) 300 mg 24 hr tablet TK 1 T PO QHS  1    naproxen (NAPROSYN) 500 mg tablet Take 1 tablet (500 mg total) by mouth as needed for moderate pain (migraines) 20 tablet 0    ondansetron (ZOFRAN-ODT) 4 mg disintegrating tablet DIS 1 TO 2 TS ON THE TONGUE Q 8 H PRN  2    topiramate (TOPAMAX) 100 mg tablet Take 1 tablet (100 mg total) by mouth 2 (two) times a day 60 tablet 4    verapamil (VERELAN PM) 120 MG 24 hr capsule Take 1 capsule (120 mg total) by mouth 2 (two) times a day 60 capsule 4    [DISCONTINUED] butalbital-acetaminophen-caffeine (FIORICET,ESGIC) -40 mg per tablet Take 1 tablet by mouth daily as needed for migraine 20 tablet 0    [DISCONTINUED] protriptyline (VIVACTIL) 10 mg tablet Take 1 tablet (10 mg total) by mouth daily at bedtime 30 tablet 2    cetirizine (ZyrTEC) 10 mg tablet Take 10 mg by mouth daily (Patient not taking: Reported on 9/9/2021)      fluocinonide (LIDEX) 0 05 % external solution       Galcanezumab-gnlm (Emgality) 120 MG/ML SOAJ Inject 1 pen under the skin every 30 (thirty) days (Patient not taking: Reported on 6/3/2021) 1 mL 5    LORazepam (ATIVAN) 0 5 mg tablet TK 1 T PO  BID PRN (Patient not taking: Reported on 6/28/2021)  0    [DISCONTINUED] amitriptyline (ELAVIL) 25 mg tablet On April 15th, decrease elavil to 50mg nightly and in another 2 weeks take 25mg at bed time  (Patient not taking: Reported on 10/24/2018 ) 60 tablet 2    [DISCONTINUED] diclofenac (VOLTAREN) 75 mg EC tablet Take 1 tablet (75 mg total) by mouth 3 (three) times a day as needed (headache) 15 tablet 0     Current Facility-Administered Medications on File Prior to Visit   Medication Dose Route Frequency Provider Last Rate Last Admin    Botulinum Toxin Type A SOLR 200 Units  200 Units Injection Q3 Months Isa Shin MD   200 Units at 06/28/21 1059         Review of Systems   Refer to positive review of systems in HPI     Review of Systems    Constitutional- No fever  Eyes- No visual change  ENT- Hearing normal  CV- No chest pain  Resp- No Shortness of breath  GI- No diarrhea  - Bladder normal  MS- No Arthritis   Skin- No rash  Psych- No depression  Endo- No DM  Heme- No nodes    Vitals:    09/09/21 1054   BP: 129/92   BP Location: Left arm   Patient Position: Sitting   Cuff Size: Standard   Pulse: (!) 107   Temp: (!) 96 2 °F (35 7 °C)   TempSrc: Tympanic   Weight: 119 kg (263 lb)   Height: 5' 5" (1 651 m)       PHYSICAL EXAM:  Appearance: No Acute Distress  Ophthalmoscopic: Disc Flat, Normal fundus  Mental status:  Orientation: Awake, Alert, and Orientedx3  Memory: Registation 3/3 Recall 3/3  Attention: normal  Knowledge: good  Language: No aphasia  Speech: No dysarthria  Cranial Nerves:  2 No Visual Defect on Confrontation, Pupils round, equal, reactive to light  3,4,6 Extraocular Movements Intact, no nystagmus  5 Facial Sensation Intact  7 No facial asymmetry  8 Intact hearing  9,10 Palate symmetric, normal gag  11 Good shoulder shrug  12 Tongue Midline  Gait: Stable  Coordination: No ataxia with finger to nose testing, and heel to shin  Sensory: Intact, Symmetric to pinprick, light touch, vibration, and joint position  Muscle Tone: Normal              Muscle exam:  Arm Right Left Leg Right Left   Deltoid 5/5 5/5 Iliopsoas 5/5 5/5   Biceps 5/5 5/5 Quads 5/5 5/5   Triceps 5/5 5/5 Hamstrings 5/5 5/5   Wrist Extension 5/5 5/5 Ankle Dorsi Flexion 5/5 5/5   Wrist Flexion 5/5 5/5 Ankle Plantar Flexion 5/5 5/5   Interossei 5/5 5/5 Ankle Eversion 5/5 5/5   APB 5/5 5/5 Ankle Inversion 5/5 5/5       Reflexes   RJ BJ TJ KJ AJ Plantars Santiago's   Right 2+ 2+ 2+ 2+ 2+ Downgoing Not present   Left 2+ 2+ 2+ 2+ 2+ Downgoing Not present     Personal review of  Labs:                  Diagnoses and all orders for this visit:          1  Intractable chronic migraine without aura and without status migrainosus  butalbital-acetaminophen-caffeine (FIORICET,ESGIC) -40 mg per tablet    protriptyline (VIVACTIL) 10 mg tablet    DISCONTINUED: protriptyline (VIVACTIL) 10 mg tablet     Patient has responded well to botox  Will resume every 3 months  She is to resume protriptyline, verapamil and topamax for prevention                    Total time of encounter:  30 min  More than 50% of the time was used in counseling and/or coordination of care  Extent of counseling and/or coordination of care        Joel Hayes MD  [facility]  [address] [place] [address]  [address]  [phone]

## 2021-09-30 ENCOUNTER — PROCEDURE VISIT (OUTPATIENT)
Dept: NEUROLOGY | Facility: CLINIC | Age: 31
End: 2021-09-30
Payer: COMMERCIAL

## 2021-09-30 VITALS — HEART RATE: 80 BPM | DIASTOLIC BLOOD PRESSURE: 81 MMHG | TEMPERATURE: 97 F | SYSTOLIC BLOOD PRESSURE: 113 MMHG

## 2021-09-30 DIAGNOSIS — G43.709 CHRONIC MIGRAINE WITHOUT AURA: Primary | ICD-10-CM

## 2021-09-30 PROCEDURE — 64615 CHEMODENERV MUSC MIGRAINE: CPT | Performed by: PSYCHIATRY & NEUROLOGY

## 2021-09-30 NOTE — PROGRESS NOTES
Universal Protocol   Consent: Verbal consent obtained  Written consent not obtained  Consent given by: patient  Time out: Immediately prior to procedure a "time out" was called to verify the correct patient, procedure, equipment, support staff and site/side marked as required  Timeout called at: 9/30/2021 11:23 AM   Patient identity confirmed: verbally with patient        Chemodenervation     Date/Time 9/30/2021 11:23 AM     Performed by  Nadia Rodas MD     Authorized by Nadia Rodas MD        Pre-procedure details      Prepped With: Alcohol     Anesthesia  (see MAR for exact dosages): Anesthesia method:  None   Procedure details     Position:  Upright   Botox     Botox Type:  Type A    Brand:  Botox    Final Concentration per CC:  50 units    Needle Gauge:  30 G 2 5 inch    Medication Administration:  200 Units Botulinum Toxin Type A 200 units   Procedures     Botox Procedures: chronic headache      Indications: migraines     Post-procedure details      Chemodenervation:  Chronic migraine    Facial Nerve Location[de-identified]  Bilateral facial nerve    Patient tolerance of procedure: Tolerated well, no immediate complications     28 DJIYF, 2 sites unit(s) was injected into the procerus muscle     5 unit(s) was injected into the  right  muscle     5 unit(s) was injected into the  left  muscle     10 unit(s) was injected into the  right frontalis muscle     10 unit(s) was injected into the  left frontalis muscle     5 units in center of forehead  20  unit(s) was injected into the  right temporalis muscle     20  unit(s) was injected into the  left temporalis muscle     15 unit(s) was injected into the  right occipitalis muscle     15 unit(s) was injected into the  left occipitalis muscle     15 unit(s) was injected into the  right cervical paraspinal muscle     15 unit(s) was injected into the  left cervical paraspinal muscle     20 unit(s) was injected into the  right trapezius muscle     20 unit(s) was injected into the  left trapezius muscle        A total of 190units were used  A total of 10units were discarded  No diagnosis found

## 2022-01-03 ENCOUNTER — PROCEDURE VISIT (OUTPATIENT)
Dept: NEUROLOGY | Facility: CLINIC | Age: 32
End: 2022-01-03
Payer: COMMERCIAL

## 2022-01-03 VITALS — DIASTOLIC BLOOD PRESSURE: 82 MMHG | TEMPERATURE: 98.6 F | SYSTOLIC BLOOD PRESSURE: 111 MMHG | HEART RATE: 85 BPM

## 2022-01-03 DIAGNOSIS — G43.719 INTRACTABLE CHRONIC MIGRAINE WITHOUT AURA AND WITHOUT STATUS MIGRAINOSUS: ICD-10-CM

## 2022-01-03 DIAGNOSIS — G43.709 CHRONIC MIGRAINE WITHOUT AURA WITHOUT STATUS MIGRAINOSUS, NOT INTRACTABLE: Primary | ICD-10-CM

## 2022-01-03 PROCEDURE — 64615 CHEMODENERV MUSC MIGRAINE: CPT | Performed by: PSYCHIATRY & NEUROLOGY

## 2022-01-03 RX ORDER — BUTALBITAL, ACETAMINOPHEN AND CAFFEINE 50; 325; 40 MG/1; MG/1; MG/1
1 TABLET ORAL DAILY PRN
Qty: 20 TABLET | Refills: 0 | Status: SHIPPED | OUTPATIENT
Start: 2022-01-03

## 2022-01-03 NOTE — PROGRESS NOTES
Universal Protocol   Consent: Written consent obtained  Consent given by: patient  Time out: Immediately prior to procedure a "time out" was called to verify the correct patient, procedure, equipment, support staff and site/side marked as required  Timeout called at: 1/3/2022 11:51 AM       Chemodenervation     Date/Time 1/3/2022 11:50 AM     Performed by  Wendy Greenberg MD     Authorized by Wendy Greenberg MD        Pre-procedure details      Prepped With: Alcohol     Anesthesia  (see MAR for exact dosages): Anesthesia method:  None   Procedure details     Position:  Upright   Botox     Botox Type:  Type A    Brand:  Botox    Final Concentration per CC:  50 units   Procedures     Botox Procedures: chronic headache      Indications: migraines     Post-procedure details      Chemodenervation:  Chronic migraine    Facial Nerve Location[de-identified]  Bilateral facial nerve    Patient tolerance of procedure: Tolerated well, no immediate complications       47 HTQHL, 2 sites unit(s) was injected into the procerus muscle     5 unit(s) was injected into the  right  muscle     5 unit(s) was injected into the  left  muscle     10 unit(s) was injected into the  right frontalis muscle     10 unit(s) was injected into the  left frontalis muscle     5 units in center of forehead  20  unit(s) was injected into the  right temporalis muscle     20  unit(s) was injected into the  left temporalis muscle     15 unit(s) was injected into the  right occipitalis muscle     15 unit(s) was injected into the  left occipitalis muscle     15 unit(s) was injected into the  right cervical paraspinal muscle     15 unit(s) was injected into the  left cervical paraspinal muscle     20 unit(s) was injected into the  right trapezius muscle     20 unit(s) was injected into the  left trapezius muscle        A total of 190units were used  A total of 10units were discarded  Diagnosis ICD-10-CM Associated Orders   1   Chronic migraine without aura without status migrainosus, not intractable  G43 342      Her frequency of migraines is 2 severe ones and 6 milder ones in a month since botox  Prior to that she was getting 15 moderate intensity ones  Will resume botox every 3 months

## 2022-02-23 ENCOUNTER — OFFICE VISIT (OUTPATIENT)
Dept: NEUROLOGY | Facility: CLINIC | Age: 32
End: 2022-02-23
Payer: COMMERCIAL

## 2022-02-23 VITALS
TEMPERATURE: 97.1 F | HEART RATE: 104 BPM | WEIGHT: 277 LBS | HEIGHT: 65 IN | DIASTOLIC BLOOD PRESSURE: 78 MMHG | SYSTOLIC BLOOD PRESSURE: 123 MMHG | BODY MASS INDEX: 46.15 KG/M2

## 2022-02-23 DIAGNOSIS — G43.719 INTRACTABLE CHRONIC MIGRAINE WITHOUT AURA AND WITHOUT STATUS MIGRAINOSUS: Primary | ICD-10-CM

## 2022-02-23 DIAGNOSIS — H81.10 BENIGN PAROXYSMAL POSITIONAL VERTIGO, UNSPECIFIED LATERALITY: ICD-10-CM

## 2022-02-23 DIAGNOSIS — H93.19 TINNITUS, UNSPECIFIED LATERALITY: ICD-10-CM

## 2022-02-23 PROCEDURE — 99215 OFFICE O/P EST HI 40 MIN: CPT | Performed by: PSYCHIATRY & NEUROLOGY

## 2022-02-23 NOTE — PROGRESS NOTES
Return NeuroOutpatient Note        Lorenzo Ryan  [id number]  30 y o   1990       Migraine, vertigo         History obtained from:  Patient     HPI/Subjective:    Lorenzo Ryan is a 29 yo F with PMH of migraines presents as f/u  Per my previous history, patient has h/o migraines since childhood  Patient is a former patient of Dr Simona Packer  When we initially started seeing her, she was getting 2 migraines a month but some months she can get upto 4 a month depending on weather change, humidity level  Abortive ones do help but she may feel odd sensation in the beginning for few minutes  Due to weight gain, she wanted to come off of elavil so we had tapered it  She's still on verapamil 120mg bid and topamax 100mg daily  Patient had reported worsening of headaches over time despite 2-3 preventives so we had decided to give botox  Patient now gets 1 severe migraine and 2-3 mild intensity headaches a month  Previously she was getting 20+ headaches a month        She has tried aimovig but it was giving GI side effects  She was then switched to [facility] for 1 year but headaches were getting worse so then it was d/c and botox paperwork was initiated         She's currently on verapamil 120mg bid, topamax 100mg bid and protriptyline 10mg qhs  She will be getting botox every 3 months  Patient recently started getting dizziness where she feels woozy if lying or turning head  It lasts only few seconds  Denies associated nausea  There is slight tinnitus where she hears sounds of alarms   There is no hearing loss       Patient had MRI brain in March 2020 at [facility]        Past Medical History:   Diagnosis Date    H/O anxiety disorder     H/O: depression     Migraine with aura     Seasonal allergies      Social History     Socioeconomic History    Marital status: Single     Spouse name: Not on file    Number of children: Not on file    Years of education: Not on file    Highest education level: Not on file   Occupational History    Not on file   Tobacco Use    Smoking status: Never Smoker    Smokeless tobacco: Never Used   Vaping Use    Vaping Use: Never used   Substance and Sexual Activity    Alcohol use: Yes     Comment: rarely    Drug use: No    Sexual activity: Not on file   Other Topics Concern    Not on file   Social History Narrative    Not on file     Social Determinants of Health     Financial Resource Strain: Not on file   Food Insecurity: Not on file   Transportation Needs: Not on file   Physical Activity: Not on file   Stress: Not on file   Social Connections: Not on file   Intimate Partner Violence: Not on file   Housing Stability: Not on file     Family History   Problem Relation Age of Onset    Migraines Mother     Seasonal affective disorder Father     Anxiety disorder Sister     Migraines Maternal Aunt     Migraines Maternal Grandmother      Allergies   Allergen Reactions    Chocolate - Food Allergy Headache    Other Headache     Annotation - [id number]: NITRATES- WINE     Current Outpatient Medications on File Prior to Visit   Medication Sig Dispense Refill    buPROPion (WELLBUTRIN XL) 300 mg 24 hr tablet 150 mg    1    butalbital-acetaminophen-caffeine (FIORICET,ESGIC) -40 mg per tablet Take 1 tablet by mouth daily as needed for migraine 20 tablet 0    cetirizine (ZyrTEC) 10 mg tablet Take 10 mg by mouth as needed        fluocinonide (LIDEX) 0 05 % external solution       naproxen (NAPROSYN) 500 mg tablet Take 1 tablet (500 mg total) by mouth as needed for moderate pain (migraines) 20 tablet 0    ondansetron (ZOFRAN-ODT) 4 mg disintegrating tablet DIS 1 TO 2 TS ON THE TONGUE Q 8 H PRN  2    protriptyline (VIVACTIL) 10 mg tablet Take 1 tablet (10 mg total) by mouth daily at bedtime 30 tablet 3    topiramate (TOPAMAX) 100 mg tablet Take 1 tablet (100 mg total) by mouth 2 (two) times a day 60 tablet 4    verapamil (VERELAN PM) 120 MG 24 hr capsule Take 1 capsule (120 mg total) by mouth 2 (two) times a day 60 capsule 4    Galcanezumab-gnlm (Emgality) 120 MG/ML SOAJ Inject 1 pen under the skin every 30 (thirty) days (Patient not taking: Reported on 6/3/2021) 1 mL 5    LORazepam (ATIVAN) 0 5 mg tablet TK 1 T PO  BID PRN (Patient not taking: Reported on 6/28/2021)  0    [DISCONTINUED] amitriptyline (ELAVIL) 25 mg tablet On April 15th, decrease elavil to 50mg nightly and in another 2 weeks take 25mg at bed time  (Patient not taking: Reported on 10/24/2018 ) 60 tablet 2    [DISCONTINUED] diclofenac (VOLTAREN) 75 mg EC tablet Take 1 tablet (75 mg total) by mouth 3 (three) times a day as needed (headache) 15 tablet 0     Current Facility-Administered Medications on File Prior to Visit   Medication Dose Route Frequency Provider Last Rate Last Admin    Botulinum Toxin Type A SOLR 200 Units  200 Units Injection Q3 Months Al Miller MD   200 Units at 09/30/21 1134    Botulinum Toxin Type A SOLR 200 Units  200 Units Injection Q3 Months Al Miller MD   200 Units at 01/03/22 1208         Review of Systems   Refer to positive review of systems in HPI     Review of Systems    Constitutional- No fever  Eyes- No visual change  ENT- Hearing normal  CV- No chest pain  Resp- No Shortness of breath  GI- No diarrhea  - Bladder normal  MS- No Arthritis   Skin- No rash  Psych- No depression  Endo- No DM  Heme- No nodes    Vitals:    02/23/22 1637   BP: 123/78   BP Location: Left arm   Patient Position: Sitting   Cuff Size: Standard   Pulse: 104   Temp: (!) 97 1 °F (36 2 °C)   TempSrc: Tympanic   Weight: 126 kg (277 lb)   Height: 5' 5" (1 651 m)       PHYSICAL EXAM:  Appearance: No Acute Distress  Ophthalmoscopic: Disc Flat, Normal fundus  Mental status:  Orientation: Awake, Alert, and Orientedx3  Memory: Registation 3/3 Recall 3/3  Attention: normal  Knowledge: good  Language: No aphasia  Speech: No dysarthria  Cranial Nerves:  2 No Visual Defect on Confrontation, Pupils round, equal, reactive to light  3,4,6 Extraocular Movements Intact, no nystagmus  5 Facial Sensation Intact  7 No facial asymmetry  8 Intact hearing  9,10 Palate symmetric, normal gag  11 Good shoulder shrug  12 Tongue Midline  Gait: Stable  Coordination: No ataxia with finger to nose testing, and heel to shin  Sensory: Intact, Symmetric to pinprick, light touch, vibration, and joint position  Muscle Tone: Normal              Muscle exam:  Arm Right Left Leg Right Left   Deltoid 5/5 5/5 Iliopsoas 5/5 5/5   Biceps 5/5 5/5 Quads 5/5 5/5   Triceps 5/5 5/5 Hamstrings 5/5 5/5   Wrist Extension 5/5 5/5 Ankle Dorsi Flexion 5/5 5/5   Wrist Flexion 5/5 5/5 Ankle Plantar Flexion 5/5 5/5   Interossei 5/5 5/5 Ankle Eversion 5/5 5/5   APB 5/5 5/5 Ankle Inversion 5/5 5/5       Reflexes   RJ BJ TJ KJ AJ Plantars Santiago's   Right 2+ 2+ 2+ 2+ 2+ Downgoing Not present   Left 2+ 2+ 2+ 2+ 2+ Downgoing Not present     Personal review of  Labs:                  Diagnoses and all orders for this visit:        1  Intractable chronic migraine without aura and without status migrainosus     2  Benign paroxysmal positional vertigo, unspecified laterality  Ambulatory Referral to Physical Therapy   3  Tinnitus, unspecified laterality  Ambulatory Referral to Otolaryngology       Patient has been doing well since being on botox  Will resume and overtime if felt possible, will wean off of topamax or verapamil  For tinnitus, will refer her to ENT  For BPPV, will refer her to PT                 Total time of encounter:  40 min  More than 50% of the time was used in counseling and/or coordination of care  Extent of counseling and/or coordination of care        Rochelle Chapa MD  [facility]  [address]  [address]  [phone]

## 2022-03-16 ENCOUNTER — OFFICE VISIT (OUTPATIENT)
Dept: OTOLARYNGOLOGY | Facility: CLINIC | Age: 32
End: 2022-03-16
Payer: COMMERCIAL

## 2022-03-16 ENCOUNTER — OFFICE VISIT (OUTPATIENT)
Dept: AUDIOLOGY | Facility: CLINIC | Age: 32
End: 2022-03-16
Payer: COMMERCIAL

## 2022-03-16 VITALS
OXYGEN SATURATION: 99 % | SYSTOLIC BLOOD PRESSURE: 108 MMHG | BODY MASS INDEX: 43.32 KG/M2 | WEIGHT: 260 LBS | HEART RATE: 107 BPM | TEMPERATURE: 98.1 F | HEIGHT: 65 IN | DIASTOLIC BLOOD PRESSURE: 72 MMHG

## 2022-03-16 DIAGNOSIS — H93.13 TINNITUS OF BOTH EARS: ICD-10-CM

## 2022-03-16 DIAGNOSIS — J31.0 CHRONIC RHINITIS: ICD-10-CM

## 2022-03-16 DIAGNOSIS — H93.19 TINNITUS, UNSPECIFIED LATERALITY: ICD-10-CM

## 2022-03-16 DIAGNOSIS — R42 DIZZINESS: Primary | ICD-10-CM

## 2022-03-16 DIAGNOSIS — H81.10 BPPV (BENIGN PAROXYSMAL POSITIONAL VERTIGO), UNSPECIFIED LATERALITY: Primary | ICD-10-CM

## 2022-03-16 DIAGNOSIS — H93.293 ABNORMAL AUDITORY PERCEPTION OF BOTH EARS: ICD-10-CM

## 2022-03-16 PROCEDURE — 92567 TYMPANOMETRY: CPT | Performed by: AUDIOLOGIST

## 2022-03-16 PROCEDURE — 99204 OFFICE O/P NEW MOD 45 MIN: CPT | Performed by: NURSE PRACTITIONER

## 2022-03-16 PROCEDURE — 92557 COMPREHENSIVE HEARING TEST: CPT | Performed by: AUDIOLOGIST

## 2022-03-16 NOTE — ASSESSMENT & PLAN NOTE
Recommend using saline irrigation and nasal steroids on daily basis for up to at least three months to see improvement  Reviewed possible allergy involvement and follow up with allergist  Consider [place] allergy panel  Also suggest obtaining CT scan to further evaluate sinus cavities  Discussed surgical options if needed      Agreed to use saline and Fluticasone daily

## 2022-03-16 NOTE — PROGRESS NOTES
Assessment/Plan:    BPPV (benign paroxysmal positional vertigo), unspecified laterality   Bilateral eac clear, no otitis media, no cerumen impaction  Regarding vertigo on exam noted positive [facility] facing right, Romberg negative, tandem walking with slight correction  Discussed possible causes of vertigo including neurology, cardiac, autoimmune, Otitis media, sinusitis, and inner ear concerns  Suggest audiogram today to further evaluate bilateral ears to rule out otitis media and meniere's disease  Audiogram today indicating normal bilateral hearing  No CHL, no SNHL  Treatment options include at home epley's, lab studies, vestibular therapy,  VNG testing, neurology consultation, MRI brain with IAC  After discussion agreed to neck and vestibular PT  Follow up in 6 weeks to monitor          Chronic rhinitis  Recommend using saline irrigation and nasal steroids on daily basis for up to at least three months to see improvement  Reviewed possible allergy involvement and follow up with allergist  Consider [facility] allergy panel  Also suggest obtaining CT scan to further evaluate sinus cavities  Discussed surgical options if needed  Agreed to use saline and Fluticasone daily          Diagnoses and all orders for this visit:    BPPV (benign paroxysmal positional vertigo), unspecified laterality  -     Ambulatory referral to Audiology  -     Ambulatory Referral to Physical Therapy; Future    Tinnitus of both ears  -     Ambulatory referral to Audiology  -     Ambulatory Referral to Physical Therapy; Future    Tinnitus, unspecified laterality  -     Ambulatory Referral to Otolaryngology  -     Ambulatory referral to Audiology  -     Ambulatory Referral to Physical Therapy; Future    Chronic rhinitis  -     Ambulatory referral to Audiology  -     Ambulatory Referral to Physical Therapy; Future          Subjective:      Patient ID: Charleen Hollingsworth is a 28 y o  female      Presents today as a new patient due to dizziness and tinnitus  Dizziness began few months ago  Occurs when in bed, while lying flat  Occurs for minute or two then resolves  If rolls over quick occurs  Also occurs if leans head backwards  No PT for concern  Tinnitus for past year or so  Sensation alarm going off or beeping in another room  Sounds in distance  Following Dr Neeta Bedolla for neurology for years  The following portions of the patient's history were reviewed and updated as appropriate: allergies, current medications, past family history, past medical history, past social history, past surgical history and problem list     Review of Systems   Constitutional: Negative  HENT: Positive for congestion  Negative for ear discharge, ear pain, hearing loss, nosebleeds, postnasal drip, rhinorrhea, sinus pressure, sinus pain, sore throat, tinnitus and voice change  Eyes: Negative  Respiratory: Negative for chest tightness and shortness of breath  Cardiovascular: Negative  Gastrointestinal: Negative  Endocrine: Negative  Musculoskeletal: Negative  Skin: Negative for color change  Neurological: Positive for dizziness  Negative for numbness and headaches  Psychiatric/Behavioral: Negative  Objective:      /72 (BP Location: Left arm, Patient Position: Sitting, Cuff Size: Large)   Pulse (!) 107   Temp 98 1 °F (36 7 °C) (Temporal)   Ht 5' 5" (1 651 m)   Wt 118 kg (260 lb)   SpO2 99%   BMI 43 27 kg/m²          Physical Exam  Constitutional:       Appearance: She is well-developed  HENT:      Head: Normocephalic  Right Ear: Hearing, tympanic membrane, ear canal and external ear normal  No decreased hearing noted  No drainage or tenderness  Tympanic membrane is not perforated or erythematous  Left Ear: Hearing, tympanic membrane, ear canal and external ear normal  No decreased hearing noted  No drainage or tenderness  Tympanic membrane is not perforated or erythematous  Nose: Nose normal  No nasal deformity or septal deviation  Mouth/Throat:      Mouth: Mucous membranes are not pale and not dry  No oral lesions  Dentition: Normal dentition  Pharynx: Uvula midline  No oropharyngeal exudate  Neck:      Trachea: No tracheal deviation  Cardiovascular:      Rate and Rhythm: Normal rate  Pulmonary:      Effort: Pulmonary effort is normal  No accessory muscle usage or respiratory distress  Musculoskeletal:      Right shoulder: Normal range of motion  Cervical back: Full passive range of motion without pain, normal range of motion and neck supple  Lymphadenopathy:      Cervical: No cervical adenopathy  Skin:     General: Skin is warm and dry  Neurological:      Mental Status: She is alert and oriented to person, place, and time  Cranial Nerves: No cranial nerve deficit  Sensory: No sensory deficit  Psychiatric:         Behavior: Behavior is cooperative

## 2022-03-16 NOTE — PATIENT INSTRUCTIONS
Nasal congestion - Saline rinses daily, Fluticasone nasal spray one spray each nostril twice day    Vertigo - physical therapy

## 2022-03-16 NOTE — ASSESSMENT & PLAN NOTE
Bilateral eac clear, no otitis media, no cerumen impaction  Regarding vertigo on exam noted positive [facility] facing right, Romberg negative, tandem walking with slight correction  Discussed possible causes of vertigo including neurology, cardiac, autoimmune, Otitis media, sinusitis, and inner ear concerns  Suggest audiogram today to further evaluate bilateral ears to rule out otitis media and meniere's disease  Audiogram today indicating normal bilateral hearing  No CHL, no SNHL  Treatment options include at home epley's, lab studies, vestibular therapy,  VNG testing, neurology consultation, MRI brain with IAC  After discussion agreed to neck and vestibular PT    Follow up in 6 weeks to monitor

## 2022-03-17 NOTE — PROGRESS NOTES
ADULT ENT HEARING EVALUATION - [place] [address] AUDIOLOGY      Patient Name: Jaimie Crain   MRN:  [id number]   :  1990   Age: 28 y o  Gender: female   DOS: 3/16/2022     HISTORY:     Jaimie Crain, a 28 y o  female, was seen on 3/16/2022 at the referral of CHRISTIANO Min,  for an evaluation of hearing as part of her ENT visit  Ms Pricilla Sher primary complaint is recurrent dizziness  She denied otalgia, otorrhea, aural fullness and tinnitus  Ms Janna Uriarte has not had her hearing tested previously  RESULTS:    Otoscopic Evaluation:   Right Ear: Unremarkable, canal clear   Left Ear: Unremarkable, canal clear    Tympanometry:   Right Ear: Type A; normal middle ear pressure and static compliance    Left Ear: Type A; normal middle ear pressure and static compliance     Audiometry:  Conventional pure tone audiometry from 250 - 8000 Hz  obtained with good reliability and revealed the following:     Right Ear: normal hearing sensitivity   Left Ear: normal hearing sensitivity     Speech Audiometry:    Speech Reception (SRT)   Right Ear: 5 dB HL   Left Ear: 15 dB HL    Word Recognition Scores (WRS):  Right Ear: excellent (100 % correct)     Left Ear: excellent (100 % correct)   Stimuli: [id number]    *see attached audiogram*      RECOMMENDATIONS:    1 ) Follow-up with referring provider  2 ) Consider VNG/further eval for dizziness  It was a pleasure working with Ms Janna Uriarte today  Thank you for referring this patient  Lima Aldana    Clinical Audiologist    [address] [place] [id number]

## 2022-03-22 ENCOUNTER — EVALUATION (OUTPATIENT)
Dept: PHYSICAL THERAPY | Facility: CLINIC | Age: 32
End: 2022-03-22
Payer: COMMERCIAL

## 2022-03-22 DIAGNOSIS — H81.10 BENIGN PAROXYSMAL POSITIONAL VERTIGO, UNSPECIFIED LATERALITY: Primary | ICD-10-CM

## 2022-03-22 PROCEDURE — 97161 PT EVAL LOW COMPLEX 20 MIN: CPT

## 2022-03-22 NOTE — PROGRESS NOTES
PT Evaluation          Insurance:  AMA/CMS Eval/ Re-eval POC expires Jessica Soler #/ Referral # Total    Start date  Expiration date Extension  Visit limitation? PT only or  PT+OT? Co-Insurance   CMS 3/22 5/17/22  NA NA NA NA NA BOMN PT No                                                                      Today's date: 3/22/2022  Patient name: Elza Aragon  : 1990  MRN: [id number]  Referring provider: Linsey Singletary MD  Dx:   Encounter Diagnosis     ICD-10-CM    1  Benign paroxysmal positional vertigo, unspecified laterality  H81 10 Ambulatory Referral to Physical Therapy         Assessment  Assessment details: Patient presents to skilled OPPT for IE with complaints of ongoing episodes of dizziness for the past 2 months  Patient reports episodes of room spinning dizziness particularly when rolling or lying back in bed or tipping her head back into end range extension  Cervical integrity intact per normal findings for the mVBI, Sharp Britni, and Alar Ligament tests  Palpation revealed hypertonicity throughout B/L upper and middle trapezius, levator scapulae, and suboccipitals  Based on patient's subjective reports, performed positional testing with the following significant results: (+) R [place]-Hallpike for R upbeating torsional nystagmus x 8 seconds with subjective reports of room spinning dizziness, likely indicative of R posterior canalithiasis BPPV  Performed 2 trials of R Epley with overall decrease in symptoms with each successive trial  Plan to reassess positionals next visit and if negative, perform oculomotor screen and balance assessment for any other potential vestibular deficits  Patient educated extensively regarding mechanism of BPPV and positions to avoid immediately following CRT; she verbalized comprehensive understanding   Patient will continue to benefit from skilled OPPT services to reduce dizziness and return to PLOF     Patient verbalized understanding of POC  Please contact me if you have any questions or recommendations  Thank you for the referral and the opportunity to share in Wiliam Wyatt's care        Cut off score   All date taken from [facility] Neuro Section or Rehab Measures    DGI:  MDC for Vestibular Disorders: 4 points  Gilbert Gast[address] for Geriatrics/Community Dwelling Older Adults: 3 Points  Falls risk cut off: <19/24    FGA:  MCID: 4 points  Geriatrics/Community Dwelling Older Adults: </= 22/30 fall risk  Geriatrics/Community Dwelling Older Adults: </= 20/30 unexplained falls in the next 6 months  Parkinsons: </= 18/30 fall risk    mCTSIB (normed on ages 19-56, lower number is less sway or better static balance)  Eyes open firm surface (norm 0 21-0 48)  Eyes closed firm surface (norm 0 48-0 99)  Eyes open foam surface (norm 0 38-0 71)  Eyes closed foam surface (norm 0 70-2 22)    DHI:  0-39: low perception of handicap  40-69: moderate perception of handicap  : severe perception of handicap  > 60: increased risk for falls        Impairments: activity intolerance, impaired balance, lacks appropriate, HEP, safety issue  Understanding of Dx/Px/POC: Excellent  Prognosis: Excellent      Goals    BPPV Goals:  - Patient will report complete resolution of symptoms in order to promote return to PLOF  - Patient will complete FGA in order to promote return to safe performance of ADLs  - Patient will demonstrate (-) Britany-Hallpike test on R side  - Patient will demonstrate (-) Roll Test on R side        Plan  Plan details: CRT, oculomotor screen, balance assessment  Patient would benefit from: PT Eval  Planned therapy interventions: balance, HEP, manual therapy, neuromuscular re-education, patient education  Frequency: 1-3x per week  Duration in weeks: 8  Plan of Care beginning date: 3/22/2022  Plan of Care expiration date: 8 weeks - 5/17/2022  Treatment plan discussed with: Patient        Subjective Evaluation    History of Present Illness   Mechanism of injury: Patient presents to skilled OPPT for IE with complaints of ongoing episodes of dizziness for the past 2 months  She reports the dizziness as room spinning, and most often experiences it when rolling in bed or tipping her head back to look up  She has been recently undergoing Botox treatment for persistent migraines, but discussed side effects with her neurologist and was told it should not cause dizziness         Dizziness Subjective  How long does dizziness last: 1-2 minutes  How would you describe the dizziness: room spinning, can't focus  Rolling in bed: Yes > on the R  Supine to/from sit: Yes      Pain  Current pain rating: NA    Social Support  Steps to enter house: 5  Stairs in house: 0  Lives in: [address] with: parents    Employment status: Yes, , Full Time  Hand dominance: R    Treatments  Previous treatment: none  Current treatment: none  Diagnostic Testing: none, MRI scheduled by neurologist      Objective     BPPV Objective  Integrity Testing  - mVBI: normal  - Sharp Britni: intact  - Alar Ligament Stability Test: intact  - Posture: rounded shoulders, mild forward head  - Palpation: hypertonicity throughout B/L suboccipitals, UT, LS, mid trap     Positional Testing  - R Britany-Hallpike: (+) for R upbeating torsional nystagmus x 8 seconds; subjective reports of room spinning dizziness  - L [place]-Hallpike: (-)  - R Roll Test: (-)  - L Roll Test: (-)    R Epley x 2 trials:  - 1st trial: R upbeating torsional nystagmus x 8 seconds; subjective reports of room spinning dizziness  - 2nd trial: no nystagmus observed; no reports of dizziness      Outcome Measures Initial Eval  3/22/2022        mCTSIB  - FTEO (firm)  - FTEC (firm)  - FTEO (foam)  - FTEC (foam)   Defer        DGI Defer        FGA Defer        10 meter Defer        DHI NA/100                                                          Precautions: none  Past Medical History:   Diagnosis Date    H/O anxiety disorder     H/O: depression     Migraine with aura     Seasonal allergies

## 2022-03-28 ENCOUNTER — OFFICE VISIT (OUTPATIENT)
Dept: PHYSICAL THERAPY | Facility: CLINIC | Age: 32
End: 2022-03-28
Payer: COMMERCIAL

## 2022-03-28 DIAGNOSIS — H81.10 BENIGN PAROXYSMAL POSITIONAL VERTIGO, UNSPECIFIED LATERALITY: Primary | ICD-10-CM

## 2022-03-28 PROCEDURE — 97530 THERAPEUTIC ACTIVITIES: CPT

## 2022-03-28 NOTE — PROGRESS NOTES
Daily Note     Today's date: 3/28/2022  Patient name: Madhav Sylvester  : 1990  MRN: [id number]  Referring provider: Emiliana Mcleod MD  Dx:   Encounter Diagnosis     ICD-10-CM    1  Benign paroxysmal positional vertigo, unspecified laterality  H81 10                   Subjective: Patient presented to session today, denies any episodes of room spinning dizziness but reports occasional "off" feeling in which she feels she is about to get dizzy but never does  Objective: See treatment diary below    TA:  Positional Testing  - R Britany-Hallpike: (-)  - L Britany-Hallpike: (-)  - R Roll Test: (-)  - L Roll Test: (-)    Oculomotor Screen  - Baseline Symptoms: 0/10  - Gaze Holding Nystagmus: none  - Spontaneous Nystagmus Room Light: none  - Smooth Pursuits (central): 0/10 dizziness, normal tracking  - Near Marsh & Shira (central): normal  - Saccades (central): 0/10 dizziness, normal  - VOR Screen (motion sensitivity): reports of blurred vision, 0/10 dizziness, able to maintain gaze  - VOR Cancel (central): reports of mild blurred vision, 0/10 dizziness, able to maintain gaze  - Head Thrust (moderate to severe): blurred vision, retinal slip to L  - Head Shaking Test (mild hypofunction): NA    - mCTSIB (see below)  - FGA (see below)  - DGI (see below)    Access Code: [email or web address]  URL: [email or web address]  Date: 2022  Prepared by: Henny Mines    Exercises  · Seated Upper Trapezius Stretch - 1 x daily - 7 x weekly - 3 sets - 30 sec hold  · Seated Levator Scapulae Stretch - 1 x daily - 7 x weekly - 3 sets - 30 sec hold  · Seated Cervical Retraction - 1 x daily - 7 x weekly - 2 sets - 10 reps - 5 sec hold      Assessment: Patient tolerated treatment session well today with focus on retesting of positionals and oculomotor screen  Positional testing revealed negative findings and no reports of room spinning dizziness   Oculomotor screen revealed (+) head thrust test for retinal slip to left, which is likely indicative of a vestibular hypofunction  She additionally reported small bouts of blurred vision with VOR x 1 and VOR cx, further suggesting deficits in gaze stabilization  Patient performed within age-matched normative values for the FGA, placing her at low risk for falls  Plan to introduce vestibular based exercises  Patient educated regarding HEP; demonstrated comprehensive understanding  Patient will continue to benefit from skilled OPPT services to reduce symptoms of dizziness and return to PLOF  Plan: Continue per plan of care  Progress treatment as tolerated  Insurance:  A/CMS Eval/ Re-eval POC expires Leonardo Hearn #/ Referral # Total    Start date  Expiration date Extension  Visit limitation? PT only or  PT+OT?  Co-Insurance   CMS 3/22 5/17/22  NA NA NA NA NA BOMN PT No                                                               Outcome Measures Initial Eval  3/22/2022        mCTSIB  - FTEO (firm)  - FTEC (firm)  - FTEO (foam)  - FTEC (foam)   30 sec  30 sec  30 sec  30 sec        DGI Defer        FGA 26/30        10 meter Defer        [facility] [address] NA/100

## 2022-03-31 ENCOUNTER — TELEPHONE (OUTPATIENT)
Dept: NEUROLOGY | Facility: CLINIC | Age: 32
End: 2022-03-31

## 2022-04-01 ENCOUNTER — TELEPHONE (OUTPATIENT)
Dept: NEUROLOGY | Facility: CLINIC | Age: 32
End: 2022-04-01

## 2022-04-01 NOTE — TELEPHONE ENCOUNTER
Fax received from [facility] for PA for sumiatriptan auto injectors  Initiated with key:[id number]    Determination pending

## 2022-04-04 ENCOUNTER — OFFICE VISIT (OUTPATIENT)
Dept: PHYSICAL THERAPY | Facility: CLINIC | Age: 32
End: 2022-04-04
Payer: COMMERCIAL

## 2022-04-04 ENCOUNTER — PROCEDURE VISIT (OUTPATIENT)
Dept: NEUROLOGY | Facility: CLINIC | Age: 32
End: 2022-04-04
Payer: COMMERCIAL

## 2022-04-04 VITALS
BODY MASS INDEX: 46.32 KG/M2 | TEMPERATURE: 96.9 F | DIASTOLIC BLOOD PRESSURE: 80 MMHG | HEIGHT: 65 IN | HEART RATE: 78 BPM | SYSTOLIC BLOOD PRESSURE: 137 MMHG | WEIGHT: 278 LBS

## 2022-04-04 DIAGNOSIS — H81.10 BENIGN PAROXYSMAL POSITIONAL VERTIGO, UNSPECIFIED LATERALITY: Primary | ICD-10-CM

## 2022-04-04 DIAGNOSIS — G43.719 INTRACTABLE CHRONIC MIGRAINE WITHOUT AURA AND WITHOUT STATUS MIGRAINOSUS: Primary | ICD-10-CM

## 2022-04-04 PROCEDURE — 97112 NEUROMUSCULAR REEDUCATION: CPT

## 2022-04-04 PROCEDURE — 64615 CHEMODENERV MUSC MIGRAINE: CPT | Performed by: PSYCHIATRY & NEUROLOGY

## 2022-04-04 NOTE — TELEPHONE ENCOUNTER
Approved per [facility]-  Appro[place] April 1  CaseId:[id number];Status:Approved; Review Type:Qty;Coverage Start Date:03/02/2022; Coverage End Date:04/01/2023;    Left message for pharm making them aware of approval

## 2022-04-04 NOTE — PROGRESS NOTES
Daily Note     Today's date: 2022  Patient name: Roscoe Ahuja  : 1990  MRN: [id number]  Referring provider: Israel Rich MD  Dx:   Encounter Diagnosis     ICD-10-CM    1  Benign paroxysmal positional vertigo, unspecified laterality  H81 10                   Subjective: Patient presented to session today, denies any episodes of room spinning dizziness but reports occasional "off" feeling in which she feels she is about to get dizzy but never does  Objective: See treatment diary below    NMR:  - VOR x 1 (firm, FT, "as fast as possible") x 30 sec   H: no dizziness; difficulty keeping letter "clear"   V: no dizziness; difficulty keeping letter "clear"    - VOR x 1 (firm, FT, "as fast as possible") x 1 min   H: no dizziness; difficulty keeping letter "clear"   V: no dizziness    VOR cx (20 reps, FT, firm) - no dizziness    - Diagonal ball pass x 20 each side: D 2-3/10  - Walking + self-ball toss 2 x 50 ft: D 0/10  - Walking + VOR x 1 (H&V): 2 x 50 ft each  - Chin tucks 2 x 10; 3 sec iso hold  - Rows w/ red tband 2 x 10; 3 sec iso hold  - Shoulder extensions w/ red tband 2 x 10; 3 sec iso hold    TA:  - HEP update: VOR x 1      Assessment: Patient tolerated treatment session well today focus on vestibular and postural strengthening exercises  Patient denied dizziness for majority of session with exception of diagonal ball pass  She does, however, report frequent loss of focus during VOR x 1, suggesting deficits with gaze stabilization  Cued on proper mechanics and isometric hold with postural strengthening exercises  Provided updated HEP with patient verbalizing comprehensive understanding  Patient will continue to benefit from skilled OPPT services to reduce symptoms of dizziness and return to PLOF  Plan: Continue per plan of care  Progress treatment as tolerated         Insurance:  AMA/CMS Eval/ Re-eval POC expires Bing Good #/ Referral # Total    Start date  Expiration date Extension  Visit limitation? PT only or  PT+OT?  Co-Insurance   CMS 3/22 5/17/22  NA NA NA NA NA BOMN PT No                                                               Outcome Measures Initial Eval  3/22/2022        mCTSIB  - FTEO (firm)  - FTEC (firm)  - FTEO (foam)  - FTEC (foam)   30 sec  30 sec  30 sec  30 sec        DGI Defer        FGA 26/30        10 meter Defer        [address] NA/100

## 2022-04-04 NOTE — PROGRESS NOTES
Universal Protocol   Consent: Verbal consent not obtained  Written consent obtained  Consent given by: patient  Time out: Immediately prior to procedure a "time out" was called to verify the correct patient, procedure, equipment, support staff and site/side marked as required  Timeout called at: 4/4/2022 4:04 PM       Chemodenervation     Date/Time 4/4/2022 4:04 PM     Performed by  Gwendolyn Ko MD     Authorized by Gwendolyn Ko MD        Pre-procedure details      Prepped With: Alcohol     Anesthesia  (see MAR for exact dosages): Anesthesia method:  None   Procedure details     Position:  Upright   Botox     Botox Type:  Type A    Brand:  Botox    Final Concentration per CC:  50 units    Medication Administration:  200 Units Botulinum Toxin Type A 200 units   Procedures     Botox Procedures: chronic headache      Indications: migraines     Post-procedure details      Chemodenervation:  Chronic migraine    Facial Nerve Location[de-identified]  Bilateral facial nerve    Patient tolerance of procedure: Tolerated well, no immediate complications       02 GQMEZ, 2 sites unit(s) was injected into the procerus muscle     5 unit(s) was injected into the  right  muscle     5 unit(s) was injected into the  left  muscle     10 unit(s) was injected into the  right frontalis muscle     10 unit(s) was injected into the  left frontalis muscle     5 units in center of forehead  20  unit(s) was injected into the  right temporalis muscle     20  unit(s) was injected into the  left temporalis muscle     15 unit(s) was injected into the  right occipitalis muscle     15 unit(s) was injected into the  left occipitalis muscle     15 unit(s) was injected into the  right cervical paraspinal muscle     15 unit(s) was injected into the  left cervical paraspinal muscle     20 unit(s) was injected into the  right trapezius muscle     20 unit(s) was injected into the  left trapezius muscle        A total of 190units were used  A total of 10units were discarded  Diagnosis ICD-10-CM Associated Orders   1   Intractable chronic migraine without aura and without status migrainosus  G43 719 Botulinum Toxin Type A SOLR 200 Units

## 2022-04-11 ENCOUNTER — OFFICE VISIT (OUTPATIENT)
Dept: PHYSICAL THERAPY | Facility: CLINIC | Age: 32
End: 2022-04-11
Payer: COMMERCIAL

## 2022-04-11 DIAGNOSIS — H81.10 BENIGN PAROXYSMAL POSITIONAL VERTIGO, UNSPECIFIED LATERALITY: Primary | ICD-10-CM

## 2022-04-11 PROCEDURE — 97112 NEUROMUSCULAR REEDUCATION: CPT

## 2022-04-11 NOTE — PROGRESS NOTES
Daily Note     Today's date: 2022  Patient name: Tomas Nichols  : 1990  MRN: [id number]  Referring provider: Cheyenne Myers MD  Dx:   Encounter Diagnosis     ICD-10-CM    1  Benign paroxysmal positional vertigo, unspecified laterality  H81 10                   Subjective: Patient arrives to session today approximately 5 mins late, reports overall compliance with HEP, states she has mild dizziness for VOR x 1  Objective: See treatment diary below    NMR:  - VOR x 1 (firm, FT, "as fast as possible") x 1 min   H: mild dizziness   V: no dizziness    - VOR x 1 (FT on foam, "as fast as possible") x 1 min   H: no dizziness   V: mild dizziness    VOR cx (20 reps H, V, CW, CCW; FT on foam) - no dizziness    - Diagonal ball pass x 20 each side: D 2/10  - Sidestepping + VOR x 1 (H&V): 2 cycles down/back each  - VOR x 1 + vergence (H&V) x 30 sec each - D 1/10  - Rows w/ red tband 2 x 10; 3 sec iso hold  - Shoulder extensions w/ red tband 2 x 10; 3 sec iso hold    TA:  HEP Update (2022): increase VOR x 1 to 1 min      Assessment: Patient tolerated treatment session well today focus on vestibular and postural strengthening exercises  Patient tolerated progression to foam with VOR exercises well, mild sway noted but no increase in dizziness symptoms  Increased VOR duration for HEP with patient verbalizing good understanding  Plan to trial busy background next visit  Patient will continue to benefit from skilled OPPT services to reduce symptoms of dizziness and return to PLOF  Plan: Continue per plan of care  Progress treatment as tolerated  Insurance:  AMA/CMS Eval/ Re-eval POC expires [facility] #/ Referral # Total    Start date  Expiration date Extension  Visit limitation? PT only or  PT+OT?  Co-Insurance   CMS 3/22 5/17/22  NA NA NA NA NA BOMN PT No                                                               Outcome Measures Initial Eval  3/22/2022        mCTSIB  - FTEO (firm)  - FTEC (firm)  - FTEO (foam)  - FTEC (foam)   30 sec  30 sec  30 sec  30 sec        DGI Defer        FGA 26/30        10 meter Defer        [facility] NA/100

## 2022-04-18 ENCOUNTER — OFFICE VISIT (OUTPATIENT)
Dept: PHYSICAL THERAPY | Facility: CLINIC | Age: 32
End: 2022-04-18
Payer: COMMERCIAL

## 2022-04-18 DIAGNOSIS — H81.10 BENIGN PAROXYSMAL POSITIONAL VERTIGO, UNSPECIFIED LATERALITY: Primary | ICD-10-CM

## 2022-04-18 PROCEDURE — 97112 NEUROMUSCULAR REEDUCATION: CPT

## 2022-04-18 NOTE — PROGRESS NOTES
Daily Note     Today's date: 2022  Patient name: Gibson Hernandez  : 1990  MRN: [id number]  Referring provider: Ladarius Tarango MD  Dx:   Encounter Diagnosis     ICD-10-CM    1  Benign paroxysmal positional vertigo, unspecified laterality  H81 10                   Subjective: Patient reports to session with no new issues or complaints  Objective: See treatment diary below    NMR:    - VOR x 1 (FT on foam, "as fast as possible") x 60 sec   H: no dizziness   V: no dizziness    - VOR x 1 (FT on foam, "as fast as possible") x 90 sec   H: no dizziness   V: no dizziness    - VOR x 1 (FT on foam, busy background, "as fast as possible") x 30 sec   H: mild dizziness   V: no dizziness     - VOR cx (20 reps H, V, CW, CCW; FT on foam, busy background) - no dizziness  - Diagonal ball pass FT on foam, busy background x 20 each side - D 2-3/10  - VOR x 1 + vergence (H&V) x 60 sec each, FT on foam w/ busy background - D: 0/10  - Sidestepping + VOR x 1 (H&V): 2 cycles down/back each    TA:  HEP Update (2022): increase VOR x 1 to 2 min      Assessment: Patient tolerated treatment session well today with continued focus on vestibular and postural strengthening exercises  Progressed to busy background for VOR exercises with mild increase in dizziness symptoms; plan to continue to progress as appropriate  Plan to perform progress note next visit with potential d/c  Patient will continue to benefit from skilled OPPT services to reduce symptoms of dizziness and return to PLOF  Plan: Continue per plan of care  Progress treatment as tolerated  Insurance:  A/CMS Eval/ Re-eval POC expires Tammy Rowe #/ Referral # Total    Start date  Expiration date Extension  Visit limitation? PT only or  PT+OT?  Co-Insurance   CMS 3/22 5/17/22  NA NA NA NA NA BOMN PT No                                                               Outcome Measures Initial Eval  3/22/2022        mCTSIB  - FTEO (firm)  - FTEC (firm)  - FTEO (foam)  - FTEC (foam)   30 sec  30 sec  30 sec  30 sec        DGI Defer        FGA 26/30        10 meter Defer        [facility] NA/100

## 2022-04-25 ENCOUNTER — EVALUATION (OUTPATIENT)
Dept: PHYSICAL THERAPY | Facility: CLINIC | Age: 32
End: 2022-04-25
Payer: COMMERCIAL

## 2022-04-25 DIAGNOSIS — H81.10 BENIGN PAROXYSMAL POSITIONAL VERTIGO, UNSPECIFIED LATERALITY: Primary | ICD-10-CM

## 2022-04-25 PROCEDURE — 97530 THERAPEUTIC ACTIVITIES: CPT

## 2022-04-25 PROCEDURE — 97112 NEUROMUSCULAR REEDUCATION: CPT

## 2022-04-25 NOTE — PROGRESS NOTES
PT Discharge         Insurance:  A/CMS Eval/ Re-eval POC expires Reather Clore #/ Referral # Total    Start date  Expiration date Extension  Visit limitation? PT only or  PT+OT? Co-Insurance   CMS 3/22 5/17/22  NA NA NA NA NA BOMN PT No                                                                      Today's date: 2022  Patient name: Shankar Cardenas  : 1990  MRN: [id number]  Referring provider: Sheri Ng MD  Dx:   Encounter Diagnosis     ICD-10-CM    1  Benign paroxysmal positional vertigo, unspecified laterality  H81 10          Assessment  Assessment details: Patient who has been presenting to skilled OPPT for IE with complaints of ongoing episodes of dizziness for the past 2 months  Patient reports overall resolution of room spinning dizziness, thus no positional testing conducted today  She additionally reports resolution of lingering dizziness and "off" feeling  Oculomotor screen revealed normal findings apart from (+) head thrust, which is likely indicative of mod-severe vestibular hypofunction  Patient reported occasional incidents of eyes "not keeping up" or "flickering " Due to patient tolerating progressions of VOR x 1 exercises with no subsequent increase in symptoms, plan to d/c this date with appropriate comprehensive HEP  Updates to HEP found below, with patient demonstrating good understanding  Patient educated to return for evaluation if dizziness returned  She has currently met all established goals  Patient will no longer benefit from skilled OPPT services at this time  Patient verbalized understanding of POC  Please contact me if you have any questions or recommendations  Thank you for the referral and the opportunity to share in Batool Wyatt's care        Cut off score   All date taken from [facility]TA Neuro Section or Rehab Measures    DGI:  [address] for Vestibular Disorders: 4 points  [address] for Geriatrics/Community Dwelling Older Adults: 3 Points  Falls risk cut off: <19/24    FGA:  MCID: 4 points  Geriatrics/Community Dwelling Older Adults: </= 22/30 fall risk  Geriatrics/Community Dwelling Older Adults: </= 20/30 unexplained falls in the next 6 months  Parkinsons: </= 18/30 fall risk    mCTSIB (normed on ages 19-56, lower number is less sway or better static balance)  Eyes open firm surface (norm 0 21-0 48)  Eyes closed firm surface (norm 0 48-0 99)  Eyes open foam surface (norm 0 38-0 71)  Eyes closed foam surface (norm 0 70-2 22)    DHI:  0-39: low perception of handicap  40-69: moderate perception of handicap  : severe perception of handicap  > 60: increased risk for falls        Impairments: activity intolerance, impaired balance, lacks appropriate, HEP, safety issue  Understanding of Dx/Px/POC: Excellent  Prognosis: Excellent      Goals    BPPV Goals:  - Patient will report complete resolution of symptoms in order to promote return to PLOF - MET  - Patient will complete FGA in order to promote return to safe performance of ADLs - MET  - Patient will demonstrate (-) Britany-Hallpike test on R side - MET  - Patient will demonstrate (-) Roll Test on R side - MET    Goals (added 4/25/2022)     Vestibular Short Term Goals (4 weeks):  - Patient will be independent with simple HEP - MET  - Patient will tolerate 60 seconds of oculomotor exercises with minimal increase in symptoms - MET      Vestibular Long Term Goals (12 weeks):  - Patient will be independent with complex HEP   - Patient will tolerate >=2 minutes of oculomotor exercises to facilitate return to reading and computer work - MET  - Patient will report >= 50% improvement on symptom severity scoring - MET  - Patient will demonstrate ability to perform HT in gait without veering - MET  - Patient will score low risk for falls with FGA test with score of 23/30 or higher per current research data - MET  - Patient will report baseline dizziness of 1/10 or less - MET  - Patient will report 2/10 dizziness or less with visual stimulating surround with duration of 2 minutes - MET  - Patient will report subjective improvement to 90% or higher to promote return to PLOF - MET    Plan  Plan details: CRT, oculomotor screen, balance assessment  Patient would benefit from: PT Eval  Planned therapy interventions: balance, HEP, manual therapy, neuromuscular re-education, patient education  Frequency: 1-3x per week  Duration in weeks: 8  Plan of Care beginning date: 3/22/2022  Plan of Care expiration date: 8 weeks - 5/17/2022  Treatment plan discussed with: Patient        Subjective Evaluation    History of Present Illness   Mechanism of injury: Patient presents to skilled OPPT for IE with complaints of ongoing episodes of dizziness for the past 2 months  She reports the dizziness as room spinning, and most often experiences it when rolling in bed or tipping her head back to look up  She has been recently undergoing Botox treatment for persistent migraines, but discussed side effects with her neurologist and was told it should not cause dizziness         Dizziness Subjective  How long does dizziness last: 1-2 minutes  How would you describe the dizziness: room spinning, can't focus  Rolling in bed: Yes > on the R  Supine to/from sit: Yes      Pain  Current pain rating: NA    Social Support  Steps to enter house: 5  Stairs in house: 0  Lives in: [facility] [place] with: parents    Employment status: Yes, , Full Time  Hand dominance: R    Treatments  Previous treatment: none  Current treatment: none  Diagnostic Testing: none, MRI scheduled by neurologist      Objective     BPPV Objective  Integrity Testing  - mVBI: normal  - Sharp Britni: intact  - Alar Ligament Stability Test: intact  - Posture: rounded shoulders, mild forward head  - Palpation: hypertonicity throughout B/L suboccipitals, UT, LS, mid trap     Positional Testing  - R [place]-Hallpike: (+) for R upbeating torsional nystagmus x 8 seconds; subjective reports of room spinning dizziness  - L Britany-Hallpike: (-)  - R Roll Test: (-)  - L Roll Test: (-)    R Epley x 2 trials:  - 1st trial: R upbeating torsional nystagmus x 8 seconds; subjective reports of room spinning dizziness  - 2nd trial: no nystagmus observed; no reports of dizziness     Updated (4/25/2022):     Oculomotor Screen  - Baseline Symptoms: 0/10  - Smooth Pursuits (central): 0/10 dizziness, normal tracking  - Near Marsh & Shira (central): normal  - Saccades (central): 0/10 dizziness, normal  - VOR Screen (motion sensitivity): 0/10 dizziness, able to maintain gaze  - VOR Cancel (central): 0/10 dizziness, able to maintain gaze  - Head Thrust (moderate to severe): consistent retinal slip to L  - Head Shaking Test (mild hypofunction): NA    HEP (updated 4/25/2022): VOR x 1 increased to 2 mins, trial busy background (wrapping paper), VOR x 2    Outcome Measures Initial Eval  3/22/2022 PN  4/25/2022       mCTSIB  - FTEO (firm)  - FTEC (firm)  - FTEO (foam)  - FTEC (foam)   30 sec  30 sec  30 sec  30 sec   NA       DGI Defer        FGA 26/30 28/30       10 meter Defer        [facility] NA/100                                                          DN (4/25/2022)  - VOR x 1 (FT on foam, self-selected "as fast as possible: H&V) x 120 sec - no dizziness    - VOR x 1 (FT on foam, busy background self-selected "as fast as possible: H&V) x 120 sec - no dizziness    - VOR x 1 (FWD/BWD walking, busy background, self-selected "as fast as possible: H&V) x 120 sec - no dizziness      Precautions: none  Past Medical History:   Diagnosis Date    H/O anxiety disorder     H/O: depression     Migraine with aura     Seasonal allergies

## 2022-04-27 ENCOUNTER — OFFICE VISIT (OUTPATIENT)
Dept: OTOLARYNGOLOGY | Facility: CLINIC | Age: 32
End: 2022-04-27
Payer: COMMERCIAL

## 2022-04-27 VITALS — BODY MASS INDEX: 44.98 KG/M2 | TEMPERATURE: 97.3 F | WEIGHT: 270 LBS | HEIGHT: 65 IN

## 2022-04-27 DIAGNOSIS — R09.89 GLOBUS SENSATION: ICD-10-CM

## 2022-04-27 DIAGNOSIS — J31.0 CHRONIC RHINITIS: ICD-10-CM

## 2022-04-27 DIAGNOSIS — H93.13 TINNITUS OF BOTH EARS: ICD-10-CM

## 2022-04-27 DIAGNOSIS — H81.10 BPPV (BENIGN PAROXYSMAL POSITIONAL VERTIGO), UNSPECIFIED LATERALITY: Primary | ICD-10-CM

## 2022-04-27 PROBLEM — R09.A2 GLOBUS SENSATION: Status: ACTIVE | Noted: 2022-04-27

## 2022-04-27 PROCEDURE — 99214 OFFICE O/P EST MOD 30 MIN: CPT | Performed by: NURSE PRACTITIONER

## 2022-04-27 RX ORDER — AZELASTINE 1 MG/ML
1 SPRAY, METERED NASAL 2 TIMES DAILY
Qty: 1 ML | Refills: 6 | Status: SHIPPED | OUTPATIENT
Start: 2022-04-27

## 2022-04-27 RX ORDER — FAMOTIDINE 40 MG/1
40 TABLET, FILM COATED ORAL
Qty: 30 TABLET | Refills: 4 | Status: SHIPPED | OUTPATIENT
Start: 2022-04-27

## 2022-04-27 NOTE — PATIENT INSTRUCTIONS
Bring copy of prior MRI disc to office for upload into computer    Continue Flonase nasal sprays    Add Astelin nasal spray - prescription    Reflux medication - Famotidine (pepcid) prescription sent - if not covered [employer] or [employer] Famotidine 20 mg tablets - comes in 200 tab bottle - take 2 tablets at bedtime

## 2022-04-27 NOTE — PROGRESS NOTES
Assessment/Plan:    BPPV (benign paroxysmal positional vertigo), unspecified laterality  BPPV resolved after treatment with PT  Recommend watchful monitoring for reoccurrence and pt agreed  If symptoms return then consider labs, imaging, VNG testing      Tinnitus of both ears  Tinnitus resolved once vertigo resolved  Monitor for reoccurrence      Chronic rhinitis  Recommend using saline irrigation and nasal steroids on daily basis for up to at least three months to see improvement  Discussed addition of Astelin nasal spray to regimen  Reviewed possible allergy involvement and follow up with allergist   Also suggest obtaining CT scan to further evaluate sinus cavities  Agreed to use saline and Fluticasone daily and adding Astelin nasal spray  Monitor progression        Globus sensation  Discussed nature of chronic post nasal drip and may be multi-factorial   Discussed nasal/sinus vs throat processes that may impact symptoms  Reviewed options including voice rest, reflux medication therapy, speech therapy, saline rinses, nasal steroids, oral antihistamines, oral steroids, allergy testing, Ct scan sinuses  Reviewed side effects and action of medications  After discussion agreed to Add Famotidine at bedtime and continue to address nasal symptoms  Follow up in 8 to 12 weeks to monitor response to medications             Diagnoses and all orders for this visit:    BPPV (benign paroxysmal positional vertigo), unspecified laterality    Tinnitus of both ears    Chronic rhinitis  -     [facility] Allergy Panel, Adult; Future  -     azelastine (ASTELIN) 0 1 % nasal spray; 1 spray into each nostril 2 (two) times a day Use in each nostril as directed    Globus sensation  -     famotidine (PEPCID) 40 MG tablet; Take 1 tablet (40 mg total) by mouth daily at bedtime          Subjective:      Patient ID: Min Haynes is a 33 y o  female  Presents today as a follow up due t multiple ENT concerns    Since last visit Tinnitus improved  And Vertigo resolved after treatment with PT  Discharged from PT  Other ENT concerns include nasal and throat concerns  Using Flonase daily  Some improvement  Notes blowing nose often, usually clear mucus  Feels clogged between nose and mouth  Concern about allergies  Taking Allegra        The following portions of the patient's history were reviewed and updated as appropriate: allergies, current medications, past family history, past medical history, past social history, past surgical history and problem list     Review of Systems   Constitutional: Negative  HENT: Positive for congestion  Negative for ear discharge, ear pain, hearing loss, nosebleeds, postnasal drip, rhinorrhea, sinus pressure, sinus pain, sore throat, tinnitus and voice change  Eyes: Negative  Respiratory: Negative for chest tightness and shortness of breath  Cardiovascular: Negative  Gastrointestinal: Negative  Endocrine: Negative  Musculoskeletal: Negative  Skin: Negative for color change  Neurological: Negative for dizziness, numbness and headaches  Psychiatric/Behavioral: Negative  Objective:      Temp (!) 97 3 °F (36 3 °C) (Temporal)   Ht 5' 5" (1 651 m)   Wt 122 kg (270 lb)   BMI 44 93 kg/m²          Physical Exam  Constitutional:       Appearance: She is well-developed  HENT:      Head: Normocephalic  Right Ear: Hearing, tympanic membrane, ear canal and external ear normal  No decreased hearing noted  No drainage or tenderness  Tympanic membrane is not perforated or erythematous  Left Ear: Hearing, tympanic membrane, ear canal and external ear normal  No decreased hearing noted  No drainage or tenderness  Tympanic membrane is not perforated or erythematous  Nose: Nose normal  No nasal deformity or septal deviation  Mouth/Throat:      Mouth: Mucous membranes are not pale and not dry  No oral lesions  Dentition: Normal dentition  Pharynx: Uvula midline  No oropharyngeal exudate  Neck:      Trachea: No tracheal deviation  Cardiovascular:      Rate and Rhythm: Normal rate  Pulmonary:      Effort: Pulmonary effort is normal  No accessory muscle usage or respiratory distress  Musculoskeletal:      Right shoulder: Normal range of motion  Cervical back: Full passive range of motion without pain, normal range of motion and neck supple  Lymphadenopathy:      Cervical: No cervical adenopathy  Skin:     General: Skin is warm and dry  Neurological:      Mental Status: She is alert and oriented to person, place, and time  Cranial Nerves: No cranial nerve deficit  Sensory: No sensory deficit  Psychiatric:         Behavior: Behavior is cooperative

## 2022-04-28 NOTE — ASSESSMENT & PLAN NOTE
Discussed nature of chronic post nasal drip and may be multi-factorial   Discussed nasal/sinus vs throat processes that may impact symptoms  Reviewed options including voice rest, reflux medication therapy, speech therapy, saline rinses, nasal steroids, oral antihistamines, oral steroids, allergy testing, Ct scan sinuses  Reviewed side effects and action of medications         After discussion agreed to Add Famotidine at bedtime and continue to address nasal symptoms  Follow up in 8 to 12 weeks to monitor response to medications

## 2022-04-28 NOTE — ASSESSMENT & PLAN NOTE
BPPV resolved after treatment with PT  Recommend watchful monitoring for reoccurrence and pt agreed    If symptoms return then consider labs, imaging, VNG testing

## 2022-04-28 NOTE — ASSESSMENT & PLAN NOTE
Recommend using saline irrigation and nasal steroids on daily basis for up to at least three months to see improvement  Discussed addition of Astelin nasal spray to regimen  Reviewed possible allergy involvement and follow up with allergist   Also suggest obtaining CT scan to further evaluate sinus cavities  Agreed to use saline and Fluticasone daily and adding Astelin nasal spray      Monitor progression

## 2022-05-09 ENCOUNTER — TELEPHONE (OUTPATIENT)
Dept: NEUROLOGY | Facility: CLINIC | Age: 32
End: 2022-05-09

## 2022-06-22 ENCOUNTER — TELEPHONE (OUTPATIENT)
Dept: NEUROLOGY | Facility: CLINIC | Age: 32
End: 2022-06-22

## 2022-06-29 DIAGNOSIS — G43.719 INTRACTABLE CHRONIC MIGRAINE WITHOUT AURA AND WITHOUT STATUS MIGRAINOSUS: Primary | ICD-10-CM

## 2022-07-05 ENCOUNTER — PROCEDURE VISIT (OUTPATIENT)
Dept: NEUROLOGY | Facility: CLINIC | Age: 32
End: 2022-07-05
Payer: COMMERCIAL

## 2022-07-05 VITALS — DIASTOLIC BLOOD PRESSURE: 79 MMHG | SYSTOLIC BLOOD PRESSURE: 110 MMHG | TEMPERATURE: 97.5 F

## 2022-07-05 DIAGNOSIS — G43.719 INTRACTABLE CHRONIC MIGRAINE WITHOUT AURA AND WITHOUT STATUS MIGRAINOSUS: Primary | ICD-10-CM

## 2022-07-05 PROCEDURE — 64615 CHEMODENERV MUSC MIGRAINE: CPT | Performed by: PSYCHIATRY & NEUROLOGY

## 2022-07-05 NOTE — PROGRESS NOTES
Universal Protocol   Consent: Written consent obtained  Consent given by: patient  Time out: Immediately prior to procedure a "time out" was called to verify the correct patient, procedure, equipment, support staff and site/side marked as required  Timeout called at: 7/5/2022 3:57 PM       Chemodenervation     Date/Time 7/5/2022 3:57 PM     Performed by  Alverto Boswell MD     Authorized by Alverto Boswell MD        Pre-procedure details      Prepped With: Alcohol     Anesthesia  (see MAR for exact dosages): Anesthesia method:  None   Procedure details     Position:  Upright   Botox     Botox Type:  Type A    Brand:  Botox    Final Concentration per CC:  50 units    Needle Gauge:  30 G 2 5 inch    Medication Administration:  200 Units Botulinum Toxin Type A 200 units   Procedures     Botox Procedures: chronic headache      Indications: migraines     Post-procedure details      Chemodenervation:  Chronic migraine    Facial Nerve Location[de-identified]  Bilateral facial nerve    Patient tolerance of procedure:   Tolerated well, no immediate complications       71 MUFCX, 2 sites unit(s) was injected into the procerus muscle     5 unit(s) was injected into the  right  muscle     5 unit(s) was injected into the  left  muscle     10 unit(s) was injected into the  right frontalis muscle     10 unit(s) was injected into the  left frontalis muscle     5 units in center of forehead  20  unit(s) was injected into the  right temporalis muscle     20  unit(s) was injected into the  left temporalis muscle     15 unit(s) was injected into the  right occipitalis muscle     15 unit(s) was injected into the  left occipitalis muscle     15 unit(s) was injected into the  right cervical paraspinal muscle     15 unit(s) was injected into the  left cervical paraspinal muscle     20 unit(s) was injected into the  right trapezius muscle     20 unit(s) was injected into the  left trapezius muscle        A total of 190units were used  A total of 10units were discarded  Diagnosis ICD-10-CM Associated Orders   1   Intractable chronic migraine without aura and without status migrainosus  G43 719

## 2022-07-19 ENCOUNTER — OFFICE VISIT (OUTPATIENT)
Dept: OTOLARYNGOLOGY | Facility: CLINIC | Age: 32
End: 2022-07-19
Payer: COMMERCIAL

## 2022-07-19 VITALS
HEIGHT: 65 IN | SYSTOLIC BLOOD PRESSURE: 110 MMHG | RESPIRATION RATE: 18 BRPM | BODY MASS INDEX: 46.65 KG/M2 | TEMPERATURE: 97.5 F | WEIGHT: 280 LBS | HEART RATE: 108 BPM | OXYGEN SATURATION: 95 % | DIASTOLIC BLOOD PRESSURE: 70 MMHG

## 2022-07-19 DIAGNOSIS — J31.0 CHRONIC RHINITIS: Primary | ICD-10-CM

## 2022-07-19 DIAGNOSIS — H93.13 TINNITUS OF BOTH EARS: ICD-10-CM

## 2022-07-19 DIAGNOSIS — H81.10 BPPV (BENIGN PAROXYSMAL POSITIONAL VERTIGO), UNSPECIFIED LATERALITY: ICD-10-CM

## 2022-07-19 DIAGNOSIS — R09.89 GLOBUS SENSATION: ICD-10-CM

## 2022-07-19 PROCEDURE — 99214 OFFICE O/P EST MOD 30 MIN: CPT | Performed by: NURSE PRACTITIONER

## 2022-07-19 NOTE — PROGRESS NOTES
Assessment/Plan:    BPPV (benign paroxysmal positional vertigo), unspecified laterality  BPPV resolved after treatment with PT  Recommend watchful monitoring for reoccurrence and pt agreed  If symptoms return then consider labs, imaging, VNG testing        Tinnitus of both ears  Tinnitus resolved once vertigo resolved  Monitor for reoccurrence        Chronic rhinitis  Recommend using saline irrigation and nasal steroids on daily basis for up to at least three months to see improvement  Discussed addition of Astelin nasal spray to regimen  Reviewed possible allergy involvement and follow up with allergist   Also suggest obtaining CT scan to further evaluate sinus cavities  Agreed to use saline and Fluticasone daily and adding Astelin nasal spray  Monitor progression           Globus sensation  Discussed nature of chronic post nasal drip and may be multi-factorial   Discussed nasal/sinus vs throat processes that may impact symptoms  Reviewed options including voice rest, reflux medication therapy, speech therapy, saline rinses, nasal steroids, oral antihistamines, oral steroids, allergy testing, Ct scan sinuses  Reviewed side effects and action of medications  Consider snoring and sleep apnea impact on throat symptoms as more bothersome in mornings       After discussion agreed to Add Famotidine at bedtime and continue to address nasal symptoms  Given order for allergy testing  Follow up in 6 months to monitor response to medications          Diagnoses and all orders for this visit:    Chronic rhinitis    Tinnitus of both ears    BPPV (benign paroxysmal positional vertigo), unspecified laterality    Globus sensation          Subjective:      Patient ID: Tracey Ward is a 33 y o  female  Presents today as a follow up due t multiple ENT concerns  Since last visit Tinnitus improved  And Vertigo resolved after treatment with PT    Discharged from PT      Other ENT concerns include nasal and throat concerns  Notes blowing nose often, usually clear mucus  Feels clogged between nose and mouth  Concern about allergies  Taking Allegra     Since last visit, Noticed when taking reflux medications does improve in symptoms  But reports forgets at times  Sensation mucus in throat at times in mornings  Frequent nose blowing in mornings  The following portions of the patient's history were reviewed and updated as appropriate: allergies, current medications, past family history, past medical history, past social history, past surgical history and problem list     Review of Systems   Constitutional: Negative  HENT: Positive for congestion  Negative for ear discharge, ear pain, hearing loss, nosebleeds, postnasal drip, rhinorrhea, sinus pressure, sinus pain, sore throat, tinnitus and voice change  Eyes: Negative  Respiratory: Negative for chest tightness and shortness of breath  Cardiovascular: Negative  Gastrointestinal: Negative  Endocrine: Negative  Musculoskeletal: Negative  Skin: Negative for color change  Neurological: Negative for dizziness, numbness and headaches  Psychiatric/Behavioral: Negative  Objective:      /70 (BP Location: Left arm, Patient Position: Sitting, Cuff Size: Large)   Pulse (!) 108   Temp 97 5 °F (36 4 °C) (Temporal)   Resp 18   Ht 5' 5" (1 651 m)   Wt 127 kg (280 lb)   SpO2 95%   BMI 46 59 kg/m²          Physical Exam  Constitutional:       Appearance: She is well-developed  HENT:      Head: Normocephalic  Right Ear: Hearing, tympanic membrane, ear canal and external ear normal  No decreased hearing noted  No drainage or tenderness  Tympanic membrane is not perforated or erythematous  Left Ear: Hearing, tympanic membrane, ear canal and external ear normal  No decreased hearing noted  No drainage or tenderness  Tympanic membrane is not perforated or erythematous        Nose: Nose normal  No nasal deformity or septal deviation  Mouth/Throat:      Mouth: Mucous membranes are not pale and not dry  No oral lesions  Dentition: Normal dentition  Pharynx: Uvula midline  No oropharyngeal exudate  Neck:      Trachea: No tracheal deviation  Cardiovascular:      Rate and Rhythm: Normal rate  Pulmonary:      Effort: Pulmonary effort is normal  No accessory muscle usage or respiratory distress  Musculoskeletal:      Right shoulder: Normal range of motion  Cervical back: Full passive range of motion without pain, normal range of motion and neck supple  Lymphadenopathy:      Cervical: No cervical adenopathy  Skin:     General: Skin is warm and dry  Neurological:      Mental Status: She is alert and oriented to person, place, and time  Cranial Nerves: No cranial nerve deficit  Sensory: No sensory deficit  Psychiatric:         Behavior: Behavior is cooperative

## 2022-07-19 NOTE — PATIENT INSTRUCTIONS
Allergy testing when able    Resume medications after allergy testing completed, use for 6 weeks    Chronic rhinitis, Reflux (LPR - laryngopharyngeal reflux), allergies    Consider snoring and sleep apnea impact

## 2022-07-19 NOTE — ASSESSMENT & PLAN NOTE
BPPV resolved after treatment with PT  Recommend watchful monitoring for reoccurrence and pt agreed  If symptoms return then consider labs, imaging, VNG testing        Tinnitus of both ears  Tinnitus resolved once vertigo resolved  Monitor for reoccurrence        Chronic rhinitis  Recommend using saline irrigation and nasal steroids on daily basis for up to at least three months to see improvement  Discussed addition of Astelin nasal spray to regimen  Reviewed possible allergy involvement and follow up with allergist   Also suggest obtaining CT scan to further evaluate sinus cavities  Agreed to use saline and Fluticasone daily and adding Astelin nasal spray  Monitor progression           Globus sensation  Discussed nature of chronic post nasal drip and may be multi-factorial   Discussed nasal/sinus vs throat processes that may impact symptoms  Reviewed options including voice rest, reflux medication therapy, speech therapy, saline rinses, nasal steroids, oral antihistamines, oral steroids, allergy testing, Ct scan sinuses  Reviewed side effects and action of medications  Consider snoring and sleep apnea impact on throat symptoms as more bothersome in mornings       After discussion agreed to Add Famotidine at bedtime and continue to address nasal symptoms    Given order for allergy testing  Follow up in 6 months to monitor response to medications

## 2022-08-08 DIAGNOSIS — G43.109 MIGRAINE WITH AURA AND WITHOUT STATUS MIGRAINOSUS, NOT INTRACTABLE: ICD-10-CM

## 2022-08-08 DIAGNOSIS — G43.719 INTRACTABLE CHRONIC MIGRAINE WITHOUT AURA AND WITHOUT STATUS MIGRAINOSUS: ICD-10-CM

## 2022-08-08 RX ORDER — TOPIRAMATE 100 MG/1
TABLET, FILM COATED ORAL
Qty: 60 TABLET | Refills: 4 | Status: SHIPPED | OUTPATIENT
Start: 2022-08-08

## 2022-10-17 ENCOUNTER — TELEPHONE (OUTPATIENT)
Dept: NEUROLOGY | Facility: CLINIC | Age: 32
End: 2022-10-17

## 2022-10-24 ENCOUNTER — PROCEDURE VISIT (OUTPATIENT)
Dept: NEUROLOGY | Facility: CLINIC | Age: 32
End: 2022-10-24
Payer: COMMERCIAL

## 2022-10-24 VITALS
TEMPERATURE: 96.5 F | HEIGHT: 65 IN | BODY MASS INDEX: 44.15 KG/M2 | DIASTOLIC BLOOD PRESSURE: 83 MMHG | HEART RATE: 87 BPM | SYSTOLIC BLOOD PRESSURE: 136 MMHG | WEIGHT: 265 LBS | OXYGEN SATURATION: 99 %

## 2022-10-24 DIAGNOSIS — G43.719 INTRACTABLE CHRONIC MIGRAINE WITHOUT AURA AND WITHOUT STATUS MIGRAINOSUS: Primary | ICD-10-CM

## 2022-10-24 PROCEDURE — 64615 CHEMODENERV MUSC MIGRAINE: CPT | Performed by: PSYCHIATRY & NEUROLOGY

## 2022-10-24 NOTE — PROGRESS NOTES
Universal Protocol   Consent: Written consent obtained  Consent given by: patient  Time out: Immediately prior to procedure a "time out" was called to verify the correct patient, procedure, equipment, support staff and site/side marked as required  Timeout called at: 10/24/2022 10:39 AM       Chemodenervation     Date/Time 10/24/2022 10:39 AM     Performed by  Jennifer Asif MD     Authorized by Jennifer Asif MD        Pre-procedure details      Prepped With: Alcohol     Anesthesia  (see MAR for exact dosages): Anesthesia method:  None   Procedure details     Position:  Upright   Botox     Botox Type:  Type A    Brand:  Botox    Final Concentration per CC:  50 units    Needle Gauge:  30 G 2 5 inch    Medication Administration:  200 Units Botulinum Toxin Type A 200 units   Procedures     Botox Procedures: chronic headache      Indications: migraines     Post-procedure details      Chemodenervation:  Chronic migraine    Facial Nerve Location[de-identified]  Bilateral facial nerve    Patient tolerance of procedure: Tolerated well, no immediate complications       Patient now reports only 1 severe migraine a month and few short lived headaches  15 units, 2 sites unit(s) was injected into the procerus muscle     5 unit(s) was injected into the  right  muscle     5 unit(s) was injected into the  left  muscle     10 unit(s) was injected into the  right frontalis muscle     10 unit(s) was injected into the  left frontalis muscle     5 units in center of forehead  20  unit(s) was injected into the  right temporalis muscle     20  unit(s) was injected into the  left temporalis muscle     15 unit(s) was injected into the  right occipitalis muscle     15 unit(s) was injected into the  left occipitalis muscle     15 unit(s) was injected into the  right cervical paraspinal muscle     15 unit(s) was injected into the  left cervical paraspinal muscle     20 unit(s) was injected into the  right trapezius muscle     20 unit(s) was injected into the  left trapezius muscle        A total of 190units were used  A total of 10units were discarded         Diagnosis ICD-10-CM Associated Orders   1   Intractable chronic migraine without aura and without status migrainosus  G43 719 Botulinum Toxin Type A SOLR 200 Units     Chemodenervation

## 2022-12-17 DIAGNOSIS — G43.719 INTRACTABLE CHRONIC MIGRAINE WITHOUT AURA AND WITHOUT STATUS MIGRAINOSUS: ICD-10-CM

## 2022-12-17 DIAGNOSIS — R09.89 GLOBUS SENSATION: ICD-10-CM

## 2022-12-19 RX ORDER — FAMOTIDINE 40 MG/1
TABLET, FILM COATED ORAL
Qty: 30 TABLET | Refills: 4 | Status: SHIPPED | OUTPATIENT
Start: 2022-12-19

## 2022-12-29 ENCOUNTER — TELEPHONE (OUTPATIENT)
Dept: NEUROLOGY | Facility: CLINIC | Age: 32
End: 2022-12-29

## 2022-12-29 NOTE — TELEPHONE ENCOUNTER
Patient PA  2022  A new PA has been sent to [facility] via [employer]  Will continue to follow up for approval/denial determination

## 2022-12-30 NOTE — TELEPHONE ENCOUNTER
Upon checking [employer] for pt PA status,  It stated PA cancelled  Phoned [facility] pre-cert department, spoke to Wesley PLUMMER that stated it had been approved and she wasn't sure as to why it was stating cancelled  Approved   Botox 200 UNITS  Qty  1  Auth# [id number]  Valid:12/29/2022-12/28/2023  Visits: 4    Please use Stock

## 2023-01-09 ENCOUNTER — TELEPHONE (OUTPATIENT)
Dept: NEUROLOGY | Facility: CLINIC | Age: 33
End: 2023-01-09

## 2023-01-24 ENCOUNTER — OFFICE VISIT (OUTPATIENT)
Dept: OTOLARYNGOLOGY | Facility: CLINIC | Age: 33
End: 2023-01-24

## 2023-01-24 VITALS — HEIGHT: 65 IN | BODY MASS INDEX: 45.82 KG/M2 | WEIGHT: 275 LBS | TEMPERATURE: 97.5 F

## 2023-01-24 DIAGNOSIS — J31.0 CHRONIC RHINITIS: ICD-10-CM

## 2023-01-24 DIAGNOSIS — R22.1 LUMP ON NECK: ICD-10-CM

## 2023-01-24 DIAGNOSIS — H81.10 BPPV (BENIGN PAROXYSMAL POSITIONAL VERTIGO), UNSPECIFIED LATERALITY: ICD-10-CM

## 2023-01-24 DIAGNOSIS — R09.89 GLOBUS SENSATION: Primary | ICD-10-CM

## 2023-01-24 DIAGNOSIS — E07.89 THYROID FULLNESS: ICD-10-CM

## 2023-01-24 DIAGNOSIS — H93.13 TINNITUS OF BOTH EARS: ICD-10-CM

## 2023-01-24 RX ORDER — SODIUM FLUORIDE 5 MG/G
CREAM DENTAL
COMMUNITY
Start: 2022-11-10

## 2023-01-24 RX ORDER — VORTIOXETINE 5 MG/1
TABLET, FILM COATED ORAL
COMMUNITY
Start: 2023-01-17

## 2023-01-24 RX ORDER — FAMOTIDINE 40 MG/1
40 TABLET, FILM COATED ORAL 2 TIMES DAILY
Qty: 60 TABLET | Refills: 4 | Status: SHIPPED | OUTPATIENT
Start: 2023-01-24

## 2023-01-24 NOTE — PROGRESS NOTES
Assessment/Plan:    Globus sensation  BPPV resolved after treatment with PT   Recommend watchful monitoring for reoccurrence and pt agreed  Valencia Burows symptoms return then consider labs, imaging, VNG testing     Tinnitus of both ears  Tinnitus resolved once vertigo resolved   Monitor for reoccurrence     Chronic rhinitis  Recommend using saline irrigation and nasal steroids on daily basis for up to at least three months to see improvement   Discussed addition of Astelin nasal spray to regimen   Reviewed possible allergy involvement and follow up with allergist  Alin Badillo suggest obtaining CT scan to further evaluate sinus cavities    Agreed to use saline and Fluticasone daily and adding Astelin nasal spray     Monitor progression     Globus sensation  Throat sensation occurring for over 6 months  Pt notes reflux feels worse currently  Laryngoscopy performed by Dr Sofya Solis today indicating lingual tonsillar tissue inflammation, long uvula touching tongue base  See procedure note  Discussed nature of chronic post nasal drip and may be multi-factorial   Discussed nasal/sinus vs throat processes that may impact symptoms   Reviewed options including voice rest, dietary changes, reflux medication therapy, speech therapy, weight management, saline rinses, nasal steroids, oral antihistamines, oral steroids, allergy testing   Reviewed side effects and action of medications  Consider snoring and sleep apnea impact on throat symptoms as more bothersome in mornings       After discussion agreed to increase Famotidine to BID and continue to address nasal symptoms  Given order for allergy testing  Follow up in 6 months to one year to monitor response to medications    Neck/thyroid fullness  Pt reported sensation neck fullness on the right  On exam right muscular tissue feels asymmetric to the left  Slight thyroid fullness as well  No defined masses  More suspicious of asymmetric muscle tissue vs mass or lump    Options include observation vs US neck and thyroid to further evaluate  Pt wishes to proceed with US  To Discuss results via phone                 Diagnoses and all orders for this visit:    Globus sensation  -     famotidine (PEPCID) 40 MG tablet; Take 1 tablet (40 mg total) by mouth 2 (two) times a day    Tinnitus of both ears    Chronic rhinitis  -     [facility] Allergy Panel, Adult; Future    BPPV (benign paroxysmal positional vertigo), unspecified laterality    Thyroid fullness  -     US thyroid; Future  -     Ultrasound head neck soft tissue; Future    Lump on neck    Other orders  -     Trintellix 5 MG tablet  -     Sodium Fluoride 5000 Plus 1 1 % CREA; BRUSH ON TEETH AT BEDTIME AND EXPECTORATE DO NOT RINSE  -     Laryngoscopy          Subjective:      Patient ID: Patrica Yu is a 33 y o  female  Presents today as a follow up due t multiple ENT concerns  Since last visit Tinnitus improved  And Vertigo resolved after treatment with PT  Discharged from PT      Other ENT concerns include nasal and throat concerns  Notes blowing nose often, usually clear mucus  Feels clogged between nose and mouth  Concern about allergies  Taking Allegra     Previous visit, Noticed when taking reflux medications does improve in symptoms  But reports forgets at times  Sensation mucus in throat at times in mornings  Frequent nose blowing in mornings  Since last visit, treated with Pepcid at bedtime  Reflux persists  Fall allergies were more bothersome, but currently stable  Vertigo and tinnitus currently resolved but did notice vertigo while had a URI that resolved with home exercises  More recently notice lump on right neck  The following portions of the patient's history were reviewed and updated as appropriate: allergies, current medications, past family history, past medical history, past social history, past surgical history and problem list     Review of Systems   Constitutional: Negative      HENT: Negative for congestion, ear discharge, ear pain, hearing loss, nosebleeds, postnasal drip, rhinorrhea, sinus pressure, sinus pain, sore throat, tinnitus and voice change  Eyes: Negative  Respiratory: Negative for chest tightness and shortness of breath  Cardiovascular: Negative  Gastrointestinal: Negative  Endocrine: Negative  Musculoskeletal: Negative  Skin: Negative for color change  Neurological: Negative for dizziness, numbness and headaches  Psychiatric/Behavioral: Negative  Objective:      Temp 97 5 °F (36 4 °C) (Temporal)   Ht 5' 5" (1 651 m)   Wt 125 kg (275 lb)   BMI 45 76 kg/m²          Physical Exam  Constitutional:       Appearance: She is well-developed  HENT:      Head: Normocephalic  Right Ear: Hearing, tympanic membrane, ear canal and external ear normal  No decreased hearing noted  No drainage or tenderness  Tympanic membrane is not perforated or erythematous  Left Ear: Hearing, tympanic membrane, ear canal and external ear normal  No decreased hearing noted  No drainage or tenderness  Tympanic membrane is not perforated or erythematous  Nose: Nose normal  No nasal deformity or septal deviation  Mouth/Throat:      Mouth: Mucous membranes are not pale and not dry  No oral lesions  Dentition: Normal dentition  Pharynx: Uvula midline  No oropharyngeal exudate  Neck:      Trachea: No tracheal deviation  Cardiovascular:      Rate and Rhythm: Normal rate  Pulmonary:      Effort: Pulmonary effort is normal  No accessory muscle usage or respiratory distress  Musculoskeletal:      Right shoulder: Normal range of motion  Cervical back: Full passive range of motion without pain, normal range of motion and neck supple  Lymphadenopathy:      Cervical: No cervical adenopathy  Skin:     General: Skin is warm and dry  Neurological:      Mental Status: She is alert and oriented to person, place, and time  Cranial Nerves:  No cranial nerve deficit  Sensory: No sensory deficit  Psychiatric:         Behavior: Behavior is cooperative  Laryngoscopy     Date/Time 1/24/2023 3:45 PM     Performed by  Tova Bush MD     Authorized by Tova Bush MD      Universal Protocol   Consent: Verbal consent obtained  Patient understanding: patient states understanding of the procedure being performed  Patient consent: the patient's understanding of the procedure matches consent given        Local anesthesia used: yes     Anesthesia   Local anesthesia used: yes  Local Anesthetic: topical anesthetic and lidocaine spray     Sedation   Patient sedated: no       Procedure Details   Procedure Notes: Nasopharynx unremarkable, with normal Eustachian tube orifices and normal Fossa of Rosenmuller bilaterally  Posterior nasopharyngeal and oropharyngeal walls normal   Oropharyngeal mucosa dry, no masses or lesions  Tongue base normal without masses or lesions  Vallecula and pyriform sinuses clear, without pooling of secretions  Epiglottis, aryepiglottic folds and remainder of supraglottis well-appearing but dry  Noted without masses or lesions, normal epiglottic chink  Of note - slight lingual inflammation, uvula is elongated and touching the tongue base    No masses no lesions

## 2023-01-24 NOTE — ASSESSMENT & PLAN NOTE
BPPV resolved after treatment with PT   Recommend watchful monitoring for reoccurrence and pt agreed  Starlette Frisk symptoms return then consider labs, imaging, VNG testing     Tinnitus of both ears  Tinnitus resolved once vertigo resolved   Monitor for reoccurrence     Chronic rhinitis  Recommend using saline irrigation and nasal steroids on daily basis for up to at least three months to see improvement   Discussed addition of Astelin nasal spray to regimen   Reviewed possible allergy involvement and follow up with allergist  Anselmo De Paz suggest obtaining CT scan to further evaluate sinus cavities    Agreed to use saline and Fluticasone daily and adding Astelin nasal spray     Monitor progression     Globus sensation  Throat sensation occurring for over 6 months  Pt notes reflux feels worse currently  Laryngoscopy performed by Dr Dc Patient today indicating lingual tonsillar tissue inflammation, long uvula touching tongue base  See procedure note  Discussed nature of chronic post nasal drip and may be multi-factorial   Discussed nasal/sinus vs throat processes that may impact symptoms   Reviewed options including voice rest, dietary changes, reflux medication therapy, speech therapy, weight management, saline rinses, nasal steroids, oral antihistamines, oral steroids, allergy testing   Reviewed side effects and action of medications  Consider snoring and sleep apnea impact on throat symptoms as more bothersome in mornings       After discussion agreed to increase Famotidine to BID and continue to address nasal symptoms  Given order for allergy testing  Follow up in 6 months to one year to monitor response to medications    Neck/thyroid fullness  Pt reported sensation neck fullness on the right  On exam right muscular tissue feels asymmetric to the left  Slight thyroid fullness as well  No defined masses  More suspicious of asymmetric muscle tissue vs mass or lump    Options include observation vs US neck and thyroid to further evaluate  Pt wishes to proceed with US    To Discuss results via phone

## 2023-01-24 NOTE — PATIENT INSTRUCTIONS
Allergy testing - hold all allergy and nasal medications for 4 to 5 days prior to allergy testing    Dietary modification - low acid, gluten free, or dairy free    Increase Pepcid    Neck and thyroid US

## 2023-01-25 ENCOUNTER — PROCEDURE VISIT (OUTPATIENT)
Dept: NEUROLOGY | Facility: CLINIC | Age: 33
End: 2023-01-25

## 2023-01-25 VITALS
TEMPERATURE: 96.5 F | HEIGHT: 65 IN | SYSTOLIC BLOOD PRESSURE: 133 MMHG | OXYGEN SATURATION: 89 % | HEART RATE: 103 BPM | DIASTOLIC BLOOD PRESSURE: 72 MMHG | BODY MASS INDEX: 43.99 KG/M2 | WEIGHT: 264 LBS

## 2023-01-25 DIAGNOSIS — G43.719 INTRACTABLE CHRONIC MIGRAINE WITHOUT AURA AND WITHOUT STATUS MIGRAINOSUS: Primary | ICD-10-CM

## 2023-01-25 NOTE — PROGRESS NOTES
Universal Protocol   Consent: Written consent obtained  Consent given by: patient  Time out: Immediately prior to procedure a "time out" was called to verify the correct patient, procedure, equipment, support staff and site/side marked as required  Timeout called at: 1/25/2023 4:14 PM       Chemodenervation     Date/Time 1/25/2023 4:13 PM     Performed by  Matt Dunbar MD     Authorized by Matt Dunbar MD        Pre-procedure details      Prepped With: Alcohol     Anesthesia  (see MAR for exact dosages): Anesthesia method:  None   Procedure details     Position:  Upright   Botox     Botox Type:  Type A    Brand:  Botox    Final Concentration per CC:  50 units    Medication Administration:  200 Units Botulinum Toxin Type A 200 units   Procedures     Botox Procedures: chronic headache      Indications: migraines     Post-procedure details      Chemodenervation:  Chronic migraine    Facial Nerve Location[de-identified]  Bilateral facial nerve    Patient tolerance of procedure: Tolerated well, no immediate complications       47 YYJYD, 2 sites unit(s) was injected into the procerus muscle     5 unit(s) was injected into the  right  muscle     5 unit(s) was injected into the  left  muscle     10 unit(s) was injected into the  right frontalis muscle     10 unit(s) was injected into the  left frontalis muscle     5 units in center of forehead  20  unit(s) was injected into the  right temporalis muscle     20  unit(s) was injected into the  left temporalis muscle     15 unit(s) was injected into the  right occipitalis muscle     15 unit(s) was injected into the  left occipitalis muscle     15 unit(s) was injected into the  right cervical paraspinal muscle     15 unit(s) was injected into the  left cervical paraspinal muscle     20 unit(s) was injected into the  right trapezius muscle     20 unit(s) was injected into the  left trapezius muscle        A total of 190units were used   A total of 10units were discarded         Diagnosis ICD-10-CM Associated Orders   1   Intractable chronic migraine without aura and without status migrainosus  G43 719 Botulinum Toxin Type A SOLR 200 Units     Chemodenervation

## 2023-02-03 ENCOUNTER — HOSPITAL ENCOUNTER (OUTPATIENT)
Dept: RADIOLOGY | Facility: HOSPITAL | Age: 33
Discharge: HOME | End: 2023-02-03

## 2023-02-03 DIAGNOSIS — E07.89 THYROID FULLNESS: ICD-10-CM

## 2023-04-26 ENCOUNTER — PROCEDURE VISIT (OUTPATIENT)
Dept: NEUROLOGY | Facility: CLINIC | Age: 33
End: 2023-04-26

## 2023-04-26 VITALS
TEMPERATURE: 96.5 F | SYSTOLIC BLOOD PRESSURE: 130 MMHG | DIASTOLIC BLOOD PRESSURE: 84 MMHG | WEIGHT: 264 LBS | BODY MASS INDEX: 43.99 KG/M2 | HEIGHT: 65 IN | HEART RATE: 65 BPM

## 2023-04-26 DIAGNOSIS — G43.109 MIGRAINE WITH AURA AND WITHOUT STATUS MIGRAINOSUS, NOT INTRACTABLE: ICD-10-CM

## 2023-04-26 DIAGNOSIS — G43.719 INTRACTABLE CHRONIC MIGRAINE WITHOUT AURA AND WITHOUT STATUS MIGRAINOSUS: Primary | ICD-10-CM

## 2023-04-26 RX ORDER — TOPIRAMATE 100 MG/1
100 TABLET, FILM COATED ORAL 2 TIMES DAILY
Qty: 60 TABLET | Refills: 4 | Status: SHIPPED | OUTPATIENT
Start: 2023-04-26

## 2023-04-26 NOTE — PROGRESS NOTES
"  Universal Protocol   Consent: Written consent obtained  Consent given by: patient  Time out: Immediately prior to procedure a \"time out\" was called to verify the correct patient, procedure, equipment, support staff and site/side marked as required  Timeout called at: 4/26/2023 10:39 AM   Patient identity confirmed: verbally with patient        Chemodenervation     Date/Time 4/26/2023 10:39 AM     Performed by  Ximena Farmer MD     Authorized by Ximena Farmer MD        Pre-procedure details      Prepped With: Alcohol     Anesthesia  (see MAR for exact dosages): Anesthesia method:  None   Procedure details     Position:  Upright   Botox     Botox Type:  Type A    Brand:  Botox    Final Concentration per CC:  50 units    Needle Gauge:  30 G 2 5 inch   Procedures     Botox Procedures: chronic headache      Indications: migraines     Post-procedure details      Chemodenervation:  Chronic migraine    Facial Nerve Location[de-identified]  Bilateral facial nerve    Patient tolerance of procedure: Tolerated well, no immediate complications       31 UJAII, 2 sites unit(s) was injected into the procerus muscle     5 unit(s) was injected into the  right  muscle     5 unit(s) was injected into the  left  muscle     10 unit(s) was injected into the  right frontalis muscle     10 unit(s) was injected into the  left frontalis muscle     5 units in center of forehead  20  unit(s) was injected into the  right temporalis muscle     20  unit(s) was injected into the  left temporalis muscle     15 unit(s) was injected into the  right occipitalis muscle     15 unit(s) was injected into the  left occipitalis muscle     20 unit(s) was injected into the  right cervical paraspinal muscle     15 unit(s) was injected into the  left cervical paraspinal muscle     20 unit(s) was injected into the  right trapezius muscle     20 unit(s) was injected into the  left trapezius muscle        A total of 195units were used   A total of 5 " units were discarded       Diagnosis ICD-10-CM Associated Orders   1  Intractable chronic migraine without aura and without status migrainosus  G43 719 Botulinum Toxin Type A SOLR 200 Units     Chemodenervation     topiramate (TOPAMAX) 100 mg tablet      2   Migraine with aura and without status migrainosus, not intractable  G43 109 Chemodenervation     topiramate (TOPAMAX) 100 mg tablet

## 2023-06-20 DIAGNOSIS — G43.719 INTRACTABLE CHRONIC MIGRAINE WITHOUT AURA AND WITHOUT STATUS MIGRAINOSUS: ICD-10-CM

## 2023-07-04 DIAGNOSIS — G43.109 MIGRAINE WITH AURA AND WITHOUT STATUS MIGRAINOSUS, NOT INTRACTABLE: ICD-10-CM

## 2023-07-05 RX ORDER — VERAPAMIL HYDROCHLORIDE 120 MG/1
CAPSULE, EXTENDED RELEASE ORAL
Qty: 60 CAPSULE | Refills: 4 | Status: SHIPPED | OUTPATIENT
Start: 2023-07-05

## 2023-07-17 ENCOUNTER — TELEPHONE (OUTPATIENT)
Dept: NEUROLOGY | Facility: CLINIC | Age: 33
End: 2023-07-17

## 2023-07-31 ENCOUNTER — PROCEDURE VISIT (OUTPATIENT)
Dept: NEUROLOGY | Facility: CLINIC | Age: 33
End: 2023-07-31
Payer: COMMERCIAL

## 2023-07-31 VITALS
TEMPERATURE: 96.1 F | SYSTOLIC BLOOD PRESSURE: 128 MMHG | HEIGHT: 65 IN | WEIGHT: 261.8 LBS | BODY MASS INDEX: 43.62 KG/M2 | DIASTOLIC BLOOD PRESSURE: 86 MMHG

## 2023-07-31 DIAGNOSIS — G43.109 MIGRAINE WITH AURA AND WITHOUT STATUS MIGRAINOSUS, NOT INTRACTABLE: ICD-10-CM

## 2023-07-31 DIAGNOSIS — G43.719 INTRACTABLE CHRONIC MIGRAINE WITHOUT AURA AND WITHOUT STATUS MIGRAINOSUS: Primary | ICD-10-CM

## 2023-07-31 PROCEDURE — 64615 CHEMODENERV MUSC MIGRAINE: CPT | Performed by: PSYCHIATRY & NEUROLOGY

## 2023-07-31 RX ORDER — BUTALBITAL, ACETAMINOPHEN AND CAFFEINE 50; 325; 40 MG/1; MG/1; MG/1
1 TABLET ORAL DAILY PRN
Qty: 20 TABLET | Refills: 0 | Status: SHIPPED | OUTPATIENT
Start: 2023-07-31

## 2023-07-31 RX ORDER — TOPIRAMATE 100 MG/1
100 TABLET, FILM COATED ORAL 2 TIMES DAILY
Qty: 60 TABLET | Refills: 4 | Status: SHIPPED | OUTPATIENT
Start: 2023-07-31

## 2023-07-31 RX ORDER — VERAPAMIL HYDROCHLORIDE 120 MG/1
120 CAPSULE, EXTENDED RELEASE ORAL 2 TIMES DAILY
Qty: 60 CAPSULE | Refills: 5 | Status: SHIPPED | OUTPATIENT
Start: 2023-07-31

## 2023-07-31 NOTE — PROGRESS NOTES
Universal Protocol   Procedure performed by:  Consent: Written consent obtained. Consent given by: patient  Time out: Immediately prior to procedure a "time out" was called to verify the correct patient, procedure, equipment, support staff and site/side marked as required. Timeout called at: 7/31/2023 11:30 AM.      Chemodenervation     Date/Time 7/31/2023 10:45 AM     Performed by  Gian Almeida MD   Authorized by Gian Almeida MD       Pre-procedure details      Prepped With: Alcohol     Anesthesia  (see MAR for exact dosages): Anesthesia method:  None   Procedure details     Position:  Upright   Botox     Botox Type:  Type A    Brand:  Botox    Final Concentration per CC:  50 units    Needle Gauge:  30 G 2.5 inch   Procedures     Botox Procedures: chronic headache      Indications: migraines     Post-procedure details      Chemodenervation:  Chronic migraine    Facial Nerve Location[de-identified]  Bilateral facial nerve    Patient tolerance of procedure: Tolerated well, no immediate complications     15 DSUOS, 2 sites unit(s) was injected into the procerus muscle.    5 unit(s) was injected into the  right  muscle.    5 unit(s) was injected into the  left  muscle.    10 unit(s) was injected into the  right frontalis muscle.    10 unit(s) was injected into the  left frontalis muscle.    5 units in center of forehead  20  unit(s) was injected into the  right temporalis muscle.    20  unit(s) was injected into the  left temporalis muscle.    15 unit(s) was injected into the  right occipitalis muscle.    15 unit(s) was injected into the  left occipitalis muscle.    20 unit(s) was injected into the  right cervical paraspinal muscle.    15 unit(s) was injected into the  left cervical paraspinal muscle.    20 unit(s) was injected into the  right trapezius muscle.    20 unit(s) was injected into the  left trapezius muscle.       A total of 195units were used.  A total of 5 units were discarded.  Diagnosis ICD-10-CM Associated Orders   1. Intractable chronic migraine without aura and without status migrainosus  G43.719 Botulinum Toxin Type A SOLR 200 Units     Chemodenervation     topiramate (TOPAMAX) 100 mg tablet      2.  Migraine with aura and without status migrainosus, not intractable  G43.109 verapamil (VERELAN PM) 120 MG 24 hr capsule     topiramate (TOPAMAX) 100 mg tablet

## 2023-08-01 DIAGNOSIS — R09.A2 GLOBUS SENSATION: ICD-10-CM

## 2023-08-02 RX ORDER — FAMOTIDINE 40 MG/1
40 TABLET, FILM COATED ORAL 2 TIMES DAILY
Qty: 180 TABLET | Refills: 3 | Status: SHIPPED | OUTPATIENT
Start: 2023-08-02

## 2023-10-10 ENCOUNTER — OFFICE VISIT (OUTPATIENT)
Dept: OTOLARYNGOLOGY | Facility: CLINIC | Age: 33
End: 2023-10-10
Payer: COMMERCIAL

## 2023-10-10 VITALS — HEIGHT: 65 IN | WEIGHT: 260 LBS | BODY MASS INDEX: 43.32 KG/M2

## 2023-10-10 DIAGNOSIS — R22.1 LUMP ON NECK: ICD-10-CM

## 2023-10-10 DIAGNOSIS — J31.0 CHRONIC RHINITIS: Primary | ICD-10-CM

## 2023-10-10 DIAGNOSIS — R09.A2 GLOBUS SENSATION: ICD-10-CM

## 2023-10-10 PROCEDURE — 99214 OFFICE O/P EST MOD 30 MIN: CPT | Performed by: NURSE PRACTITIONER

## 2023-10-10 RX ORDER — FLUOCINOLONE ACETONIDE 0.11 MG/ML
OIL TOPICAL
COMMUNITY
Start: 2023-09-13

## 2023-10-10 RX ORDER — VORTIOXETINE 10 MG/1
10 TABLET, FILM COATED ORAL DAILY
COMMUNITY
Start: 2023-09-19

## 2023-10-10 NOTE — PROGRESS NOTES
Assessment/Plan:    Chronic rhinitis  History of BPPV, and resolved after treatment with PT. Recommend watchful monitoring for reoccurrence and pt agreed. If symptoms return then consider labs, imaging, VNG testing     Tinnitus of both ears  Tinnitus resolved once vertigo resolved. Monitor for reoccurrence     Chronic rhinitis  Recommend using saline irrigation and nasal steroids on daily basis for up to at least three months to see improvement. Reviewed possible allergy involvement and follow up with allergist.    Monitor progression     Globus sensation  Throat sensation occurring for over 6 months. Pt notes reflux feels comes and goes. Laryngoscopy performed by Dr Weathers Lux 01/2023 indicating lingual tonsillar tissue inflammation, long uvula touching tongue base. Discussed nature of chronic post nasal drip and may be multi-factorial.  Discussed nasal/sinus vs throat processes that may impact symptoms. Reviewed options including voice rest, dietary changes, reflux medication therapy, speech therapy, weight management, saline rinses, nasal steroids, oral antihistamines, oral steroids, allergy testing. Reviewed side effects and action of medications. Consider snoring and sleep apnea impact on throat symptoms as more bothersome in mornings. After discussion agreed to continue Famotidine to BID and continue to address nasal symptoms. Given order for allergy testing  Follow up in 6 months to one year to monitor response to medications     Neck/thyroid fullness  Pt reported sensation neck fullness on the right. On exam right muscular tissue feels asymmetric to the left. Slight thyroid fullness as well. No defined masses. More suspicious of asymmetric muscle/fatty tissue vs mass or lump. Thyroid us from 02/2023 indicating    Right lobe: 5.2 x 1.3 x 2.0 cm. Volume 6.6 mL  Left lobe:  4.7 x 1.5 x 1.9 cm. Volume 6.5 mL  Isthmus: 0.2  cm.    In the area of palpable concern in the right lateral anterior neck there is a 1.2 x 0.5 x 1.2 cm morphologically normal lymph node. This is unclear whether this lymph node accounts for the palpable finding. Otherwise no suspicious sonographically   detectable finding identified in the area of patient concern. Neck US 02/2023 reported  Unremarkable thyroid ultrasound. In the area of palpable concern in the right lateral anterior neck there is a 1.2 x 0.5 x 1.2 cm morphologically normal lymph node. This is unclear whether this lymph node accounts for the palpable finding. Otherwise no suspicious sonographically   detectable finding identified in the area of patient concern. Reassured pt no worrisome processes within the neck. Follow up once per year for medication management, sooner if needed            Diagnoses and all orders for this visit:    Chronic rhinitis  -     [facility] Panel, Adult; Future    Globus sensation    Lump on neck    Other orders  -     Trintellix 10 MG tablet; Take 10 mg by mouth daily Take at the same time  -     Fluocinolone Acetonide Scalp 0.01 % OIL; APPLY A THIN LAYER TWICE A DAY TO AFFECTED AREAS ON THE SCALP AND NECK          Subjective:      Patient ID: Daquan Flores is a 29 y.o. female. Presents today as a follow up due to multiple ENT concerns. Since last visit Tinnitus improved  And Vertigo resolved after treatment with PT. Discharged from PT. Other ENT concerns include nasal and throat concerns. Notes blowing nose often, usually clear mucus. Feels clogged between nose and mouth. Concern about allergies. Taking Allegra     Previous visit, Noticed when taking reflux medications does improve in symptoms. But reports forgets at times. Sensation mucus in throat at times in mornings. Frequent nose blowing in mornings. Since last visit, treated with Pepcid at bedtime. Reflux persists, more notiacable if misses dose. Fall allergies were more bothersome, but currently stable.   Vertigo and tinnitus currently resolved. More recently notice lump on right neck. Denies discomfort at site, no change in size. The following portions of the patient's history were reviewed and updated as appropriate: allergies, current medications, past family history, past medical history, past social history, past surgical history, and problem list.    Review of Systems   Constitutional: Negative. HENT:  Negative for congestion, ear discharge, ear pain, hearing loss, nosebleeds, postnasal drip, rhinorrhea, sinus pressure, sinus pain, sore throat, tinnitus and voice change. Respiratory:  Negative for chest tightness and shortness of breath. Skin:  Negative for color change. Neurological:  Negative for dizziness, numbness and headaches. Psychiatric/Behavioral: Negative. Objective:      Ht 5' 5" (1.651 m)   Wt 118 kg (260 lb)   BMI 43.27 kg/m²          Physical Exam  Constitutional:       Appearance: She is well-developed. HENT:      Head: Normocephalic. Right Ear: Hearing, tympanic membrane, ear canal and external ear normal. No decreased hearing noted. No drainage or tenderness. Tympanic membrane is not perforated or erythematous. Left Ear: Hearing, tympanic membrane, ear canal and external ear normal. No decreased hearing noted. No drainage or tenderness. Tympanic membrane is not perforated or erythematous. Nose: Nose normal. No nasal deformity or septal deviation. Mouth/Throat:      Mouth: Mucous membranes are not pale and not dry. No oral lesions. Dentition: Normal dentition. Pharynx: Uvula midline. No oropharyngeal exudate. Neck:      Trachea: No tracheal deviation. Pulmonary:      Effort: Pulmonary effort is normal. No accessory muscle usage or respiratory distress. Musculoskeletal:      Cervical back: Full passive range of motion without pain and neck supple. Lymphadenopathy:      Cervical: No cervical adenopathy.    Skin:     General: Skin is warm and dry. Neurological:      Mental Status: She is alert and oriented to person, place, and time. Cranial Nerves: No cranial nerve deficit. Sensory: No sensory deficit. Psychiatric:         Behavior: Behavior is cooperative.

## 2023-10-10 NOTE — ASSESSMENT & PLAN NOTE
History of BPPV, and resolved after treatment with PT. Recommend watchful monitoring for reoccurrence and pt agreed. If symptoms return then consider labs, imaging, VNG testing     Tinnitus of both ears  Tinnitus resolved once vertigo resolved. Monitor for reoccurrence     Chronic rhinitis  Recommend using saline irrigation and nasal steroids on daily basis for up to at least three months to see improvement. Reviewed possible allergy involvement and follow up with allergist.    Monitor progression     Globus sensation  Throat sensation occurring for over 6 months. Pt notes reflux feels comes and goes. Laryngoscopy performed by Dr Jesica Meraz 01/2023 indicating lingual tonsillar tissue inflammation, long uvula touching tongue base. Discussed nature of chronic post nasal drip and may be multi-factorial.  Discussed nasal/sinus vs throat processes that may impact symptoms. Reviewed options including voice rest, dietary changes, reflux medication therapy, speech therapy, weight management, saline rinses, nasal steroids, oral antihistamines, oral steroids, allergy testing. Reviewed side effects and action of medications. Consider snoring and sleep apnea impact on throat symptoms as more bothersome in mornings. After discussion agreed to continue Famotidine to BID and continue to address nasal symptoms. Given order for allergy testing  Follow up in 6 months to one year to monitor response to medications     Neck/thyroid fullness  Pt reported sensation neck fullness on the right. On exam right muscular tissue feels asymmetric to the left. Slight thyroid fullness as well. No defined masses. More suspicious of asymmetric muscle/fatty tissue vs mass or lump. Thyroid us from 02/2023 indicating    Right lobe: 5.2 x 1.3 x 2.0 cm. Volume 6.6 mL  Left lobe:  4.7 x 1.5 x 1.9 cm. Volume 6.5 mL  Isthmus: 0.2  cm.    In the area of palpable concern in the right lateral anterior neck there is a 1.2 x 0.5 x 1.2 cm morphologically normal lymph node. This is unclear whether this lymph node accounts for the palpable finding. Otherwise no suspicious sonographically   detectable finding identified in the area of patient concern. Neck US 02/2023 reported  Unremarkable thyroid ultrasound. In the area of palpable concern in the right lateral anterior neck there is a 1.2 x 0.5 x 1.2 cm morphologically normal lymph node. This is unclear whether this lymph node accounts for the palpable finding. Otherwise no suspicious sonographically   detectable finding identified in the area of patient concern. Reassured pt no worrisome processes within the neck.       Follow up once per year for medication management, sooner if needed

## 2023-10-10 NOTE — PATIENT INSTRUCTIONS
Nasal concerns - Saline rinses daily, Fluticasone nasal spray one spray each nostril twice per day, Astelin nasal spray one spray each nostril twice per day. Hold all nasal sprays, oral antihistamines, and Pepcid prior to allergy testing for 5 days     Reflux - consider dietary changes, gluten free, dairy free, low acid, limit alcohol intake.   As needed medications include Tums,Gaviscon, or reflux gourmet (found on [facility])

## 2023-10-19 ENCOUNTER — TELEPHONE (OUTPATIENT)
Dept: NEUROLOGY | Facility: CLINIC | Age: 33
End: 2023-10-19

## 2023-11-02 ENCOUNTER — PROCEDURE VISIT (OUTPATIENT)
Dept: NEUROLOGY | Facility: CLINIC | Age: 33
End: 2023-11-02
Payer: COMMERCIAL

## 2023-11-02 VITALS
OXYGEN SATURATION: 98 % | TEMPERATURE: 96.5 F | BODY MASS INDEX: 44.48 KG/M2 | WEIGHT: 267 LBS | SYSTOLIC BLOOD PRESSURE: 140 MMHG | HEIGHT: 65 IN | DIASTOLIC BLOOD PRESSURE: 96 MMHG | HEART RATE: 111 BPM

## 2023-11-02 DIAGNOSIS — G43.719 INTRACTABLE CHRONIC MIGRAINE WITHOUT AURA AND WITHOUT STATUS MIGRAINOSUS: Primary | ICD-10-CM

## 2023-11-02 PROCEDURE — 64615 CHEMODENERV MUSC MIGRAINE: CPT | Performed by: PSYCHIATRY & NEUROLOGY

## 2023-11-02 NOTE — PROGRESS NOTES
Universal Protocol   Consent: Written consent obtained. Consent given by: patient  Time out: Immediately prior to procedure a "time out" was called to verify the correct patient, procedure, equipment, support staff and site/side marked as required. Timeout called at: 11/2/2023 11:34 AM.      Chemodenervation     Date/Time  11/2/2023 11:30 AM     Performed by  Helen Kirby MD   Authorized by  Helen Kirby MD     Pre-procedure details      Prepped With: Alcohol     Anesthesia  (see MAR for exact dosages): Anesthesia method:  None   Procedure details      Position:  Upright   Botox      Botox Type:  Type A    Brand:  Botox    Final Concentration per CC:  50 units    Needle Gauge:  30 G 2.5 inch   Procedures      Botox Procedures: chronic headache      Indications: migraines     Post-procedure details      Chemodenervation:  Chronic migraine    Facial Nerve Location[de-identified]  Bilateral facial nerve    Patient tolerance of procedure: Tolerated well, no immediate complications     15 units, 2 sites unit(s) was injected into the procerus muscle. 5 unit(s) was injected into the  right  muscle. 5 unit(s) was injected into the  left  muscle. 10 unit(s) was injected into the  right frontalis muscle. 10 unit(s) was injected into the  left frontalis muscle. 5 units in center of forehead  20  unit(s) was injected into the  right temporalis muscle. 20  unit(s) was injected into the  left temporalis muscle. 15 unit(s) was injected into the  right occipitalis muscle. 15 unit(s) was injected into the  left occipitalis muscle. 20 unit(s) was injected into the  right cervical paraspinal muscle. 20 unit(s) was injected into the  left cervical paraspinal muscle. 20 unit(s) was injected into the  right trapezius muscle. 20 unit(s) was injected into the  left trapezius muscle. A total of 195units were used. A total of 5 units were discarded.         Diagnosis ICD-10-CM Associated Orders   1. Intractable chronic migraine without aura and without status migrainosus  G43.710 Botulinum Toxin Type A SOLR 200 Units     Chemodenervation          Patient has been reporting word finding difficulty lately. Asked her to reduce topamax to 100mg daily from 100mg bid and then will adjust depending on whether it makes a difference.

## 2023-11-18 DIAGNOSIS — G43.719 INTRACTABLE CHRONIC MIGRAINE WITHOUT AURA AND WITHOUT STATUS MIGRAINOSUS: ICD-10-CM

## 2024-01-19 ENCOUNTER — TELEPHONE (OUTPATIENT)
Dept: NEUROLOGY | Facility: CLINIC | Age: 34
End: 2024-01-19

## 2024-01-24 ENCOUNTER — TELEPHONE (OUTPATIENT)
Dept: NEUROLOGY | Facility: CLINIC | Age: 34
End: 2024-01-24

## 2024-02-05 ENCOUNTER — TELEPHONE (OUTPATIENT)
Dept: NEUROLOGY | Facility: CLINIC | Age: 34
End: 2024-02-05

## 2024-02-05 NOTE — TELEPHONE ENCOUNTER
Received fax from insurance with the following approval details:    Approved     Botox 200 UNITS    Qty. 1    Auth# [id number]    Valid:1/24/2024-1/23/2025    Visits:4     Please use stock

## 2024-02-07 ENCOUNTER — PROCEDURE VISIT (OUTPATIENT)
Dept: NEUROLOGY | Facility: CLINIC | Age: 34
End: 2024-02-07
Payer: COMMERCIAL

## 2024-02-07 VITALS
HEIGHT: 65 IN | TEMPERATURE: 97 F | DIASTOLIC BLOOD PRESSURE: 84 MMHG | HEART RATE: 114 BPM | WEIGHT: 267 LBS | SYSTOLIC BLOOD PRESSURE: 128 MMHG | BODY MASS INDEX: 44.48 KG/M2 | OXYGEN SATURATION: 98 %

## 2024-02-07 DIAGNOSIS — G43.719 INTRACTABLE CHRONIC MIGRAINE WITHOUT AURA AND WITHOUT STATUS MIGRAINOSUS: Primary | ICD-10-CM

## 2024-02-07 PROCEDURE — 64615 CHEMODENERV MUSC MIGRAINE: CPT

## 2024-02-07 RX ORDER — ZONISAMIDE 50 MG/1
50 CAPSULE ORAL
Qty: 30 CAPSULE | Refills: 4 | Status: SHIPPED | OUTPATIENT
Start: 2024-02-07

## 2024-02-07 NOTE — PROGRESS NOTES
"  Universal Protocol    Consent: Written consent obtained.  Consent given by: patient  Time out: Immediately prior to procedure a \"time out\" was called to verify the correct patient, procedure, equipment, support staff and site/side marked as required.  Timeout called at: 2/7/24 at 12:11pm.        Chemodenervation      Date/Time  2/7/2024 12:12pm      Performed by  Usha Alamo MD   Authorized by  Usha Alamo MD     Pre-procedure details      Prepped With: Alcohol     Anesthesia  (see MAR for exact dosages):     Anesthesia method:  None   Procedure details      Position:  Upright   Botox      Botox Type:  Type A    Brand:  Botox    Final Concentration per CC:  50 units    Needle Gauge:  30 G 2.5 inch   Procedures      Botox Procedures: chronic headache      Indications: migraines     Post-procedure details      Chemodenervation:  Chronic migraine    Facial Nerve Location::  Bilateral facial nerve    Patient tolerance of procedure:  Tolerated well, no immediate complications      15 units, 2 sites unit(s) was injected into the procerus muscle.    5 unit(s) was injected into the  right  muscle.    5 unit(s) was injected into the  left  muscle.    10 unit(s) was injected into the  right frontalis muscle.    10 unit(s) was injected into the  left frontalis muscle.    5 units in center of forehead  20  unit(s) was injected into the  right temporalis muscle.    20  unit(s) was injected into the  left temporalis muscle.    15 unit(s) was injected into the  right occipitalis muscle.    15 unit(s) was injected into the  left occipitalis muscle.    20 unit(s) was injected into the  right cervical paraspinal muscle.    20 unit(s) was injected into the  left cervical paraspinal muscle.    20 unit(s) was injected into the  right trapezius muscle.    20 unit(s) was injected into the  left trapezius muscle.       A total of 195units were used. A total of 5 units were discarded.      Diagnosis ICD-10-CM " Associated Orders   1. Intractable chronic migraine without aura and without status migrainosus  G43.719 Botulinum Toxin Type A SOLR 200 Units     zonisamide (ZONEGRAN) 50 MG capsule

## 2024-05-01 ENCOUNTER — TELEPHONE (OUTPATIENT)
Dept: NEUROLOGY | Facility: CLINIC | Age: 34
End: 2024-05-01

## 2024-05-15 ENCOUNTER — PROCEDURE VISIT (OUTPATIENT)
Dept: NEUROLOGY | Facility: CLINIC | Age: 34
End: 2024-05-15
Payer: COMMERCIAL

## 2024-05-15 VITALS
HEART RATE: 95 BPM | HEIGHT: 65 IN | BODY MASS INDEX: 48.48 KG/M2 | WEIGHT: 291 LBS | OXYGEN SATURATION: 99 % | TEMPERATURE: 96 F | DIASTOLIC BLOOD PRESSURE: 74 MMHG | SYSTOLIC BLOOD PRESSURE: 128 MMHG

## 2024-05-15 DIAGNOSIS — G43.719 INTRACTABLE CHRONIC MIGRAINE WITHOUT AURA AND WITHOUT STATUS MIGRAINOSUS: Primary | ICD-10-CM

## 2024-05-15 PROCEDURE — 64615 CHEMODENERV MUSC MIGRAINE: CPT | Performed by: PSYCHIATRY & NEUROLOGY

## 2024-05-15 RX ORDER — ONDANSETRON 4 MG/1
4 TABLET, ORALLY DISINTEGRATING ORAL EVERY 8 HOURS PRN
Qty: 90 TABLET | Refills: 0 | Status: SHIPPED | OUTPATIENT
Start: 2024-05-15

## 2024-05-15 NOTE — PROGRESS NOTES
"Universal Protocol   Consent: Written consent obtained.  Consent given by: patient  Time out: Immediately prior to procedure a \"time out\" was called to verify the correct patient, procedure, equipment, support staff and site/side marked as required.  Timeout called at: 5/15/2024 10:28 AM.      Chemodenervation     Date/Time  5/15/2024 9:30 AM     Performed by  Usha Alamo MD   Authorized by  Usha Alamo MD     Pre-procedure details      Prepped With: Alcohol     Anesthesia  (see MAR for exact dosages):     Anesthesia method:  None   Procedure details      Position:  Upright   Botox      Botox Type:  Type A    Brand:  Botox    Final Concentration per CC:  50 units    Needle Gauge:  30 G 2.5 inch   Procedures      Botox Procedures: chronic headache      Indications: migraines     Post-procedure details      Chemodenervation:  Chronic migraine    Facial Nerve Location::  Bilateral facial nerve    Patient tolerance of procedure:  Tolerated well, no immediate complications       15 units, 2 sites unit(s) was injected into the procerus muscle.    5 unit(s) was injected into the  right  muscle.    5 unit(s) was injected into the  left  muscle.    10 unit(s) was injected into the  right frontalis muscle.    10 unit(s) was injected into the  left frontalis muscle.    5 units in center of forehead  20  unit(s) was injected into the  right temporalis muscle.    20  unit(s) was injected into the  left temporalis muscle.    15 unit(s) was injected into the  right occipitalis muscle.    15 unit(s) was injected into the  left occipitalis muscle.    20 unit(s) was injected into the  right cervical paraspinal muscle.    20 unit(s) was injected into the  left cervical paraspinal muscle.    20 unit(s) was injected into the  right trapezius muscle.    20 unit(s) was injected into the  left trapezius muscle.       A total of 200 units were used. A total of 0 units were discarded.      Diagnosis ICD-10-CM " Associated Orders   1. Intractable chronic migraine without aura and without status migrainosus  G43.719 Botulinum Toxin Type A SOLR 200 Units     Chemodenervation     ondansetron (ZOFRAN-ODT) 4 mg disintegrating tablet     protriptyline (VIVACTIL) 10 mg tablet          She does experience nausea more with her migraines although word finding hasn't happened with zonegran so she's happy about that and would like to resume as is.  For symptomatic relief, will prescribe some zofran.

## 2024-07-28 ENCOUNTER — TELEPHONE (OUTPATIENT)
Dept: NEUROLOGY | Facility: CLINIC | Age: 34
End: 2024-07-28

## 2024-07-28 DIAGNOSIS — G43.109 MIGRAINE WITH AURA AND WITHOUT STATUS MIGRAINOSUS, NOT INTRACTABLE: ICD-10-CM

## 2024-08-07 ENCOUNTER — TELEPHONE (OUTPATIENT)
Dept: NEUROLOGY | Facility: CLINIC | Age: 34
End: 2024-08-07

## 2024-08-21 ENCOUNTER — PROCEDURE VISIT (OUTPATIENT)
Dept: NEUROLOGY | Facility: CLINIC | Age: 34
End: 2024-08-21
Payer: COMMERCIAL

## 2024-08-21 VITALS
SYSTOLIC BLOOD PRESSURE: 134 MMHG | BODY MASS INDEX: 48.32 KG/M2 | OXYGEN SATURATION: 98 % | WEIGHT: 290 LBS | DIASTOLIC BLOOD PRESSURE: 84 MMHG | HEART RATE: 104 BPM | TEMPERATURE: 96.9 F | HEIGHT: 65 IN

## 2024-08-21 DIAGNOSIS — G43.719 INTRACTABLE CHRONIC MIGRAINE WITHOUT AURA AND WITHOUT STATUS MIGRAINOSUS: Primary | ICD-10-CM

## 2024-08-21 DIAGNOSIS — G43.109 MIGRAINE WITH AURA AND WITHOUT STATUS MIGRAINOSUS, NOT INTRACTABLE: ICD-10-CM

## 2024-08-21 PROCEDURE — 64615 CHEMODENERV MUSC MIGRAINE: CPT | Performed by: PSYCHIATRY & NEUROLOGY

## 2024-08-21 RX ORDER — RIMEGEPANT SULFATE 75 MG/75MG
75 TABLET, ORALLY DISINTEGRATING ORAL EVERY OTHER DAY
Qty: 16 TABLET | Refills: 3 | Status: SHIPPED | OUTPATIENT
Start: 2024-08-21

## 2024-08-21 RX ORDER — VERAPAMIL HYDROCHLORIDE 120 MG/1
TABLET, FILM COATED, EXTENDED RELEASE ORAL
Qty: 180 TABLET | OUTPATIENT
Start: 2024-08-21

## 2024-08-21 RX ORDER — ZONISAMIDE 50 MG/1
50 CAPSULE ORAL
Qty: 30 CAPSULE | Refills: 4 | Status: SHIPPED | OUTPATIENT
Start: 2024-08-21

## 2024-08-21 RX ORDER — ONDANSETRON 4 MG/1
4 TABLET, ORALLY DISINTEGRATING ORAL EVERY 8 HOURS PRN
Qty: 90 TABLET | Refills: 0 | Status: SHIPPED | OUTPATIENT
Start: 2024-08-21

## 2024-08-21 RX ORDER — VERAPAMIL HYDROCHLORIDE 120 MG/1
120 CAPSULE, EXTENDED RELEASE ORAL 2 TIMES DAILY
Qty: 180 CAPSULE | Refills: 2 | Status: SHIPPED | OUTPATIENT
Start: 2024-08-21

## 2024-08-21 NOTE — PROGRESS NOTES
"Universal Protocol   procedure performed by consultantConsent: Written consent obtained.  Consent given by: patient  Time out: Immediately prior to procedure a \"time out\" was called to verify the correct patient, procedure, equipment, support staff and site/side marked as required.  Timeout called at: 8/21/2024 11:04 AM.  Patient identity confirmed: verbally with patient      Chemodenervation     Date/Time  8/21/2024 10:10 AM     Performed by  Usha Alamo MD   Authorized by  Usha Alamo MD     Pre-procedure details      Prepped With: Alcohol     Anesthesia  (see MAR for exact dosages):     Anesthesia method:  None   Procedure details      Position:  Upright   Botox      Botox Type:  Type A    Brand:  Botox    Final Concentration per CC:  50 units    Needle Gauge:  30 G 2.5 inch   Procedures      Botox Procedures: chronic headache      Indications: migraines     Post-procedure details      Chemodenervation:  Chronic migraine    Facial Nerve Location::  Bilateral facial nerve    Patient tolerance of procedure:  Tolerated well, no immediate complications       15 units, 2 sites unit(s) was injected into the procerus muscle.    5 unit(s) was injected into the  right  muscle.    5 unit(s) was injected into the  left  muscle.    10 unit(s) was injected into the  right frontalis muscle.    10 unit(s) was injected into the  left frontalis muscle.    5 units in center of forehead  20  unit(s) was injected into the  right temporalis muscle.    20  unit(s) was injected into the  left temporalis muscle.    15 unit(s) was injected into the  right occipitalis muscle.    15 unit(s) was injected into the  left occipitalis muscle.    20 unit(s) was injected into the  right cervical paraspinal muscle.    20 unit(s) was injected into the  left cervical paraspinal muscle.    20 unit(s) was injected into the  right trapezius muscle.    20 unit(s) was injected into the  left trapezius muscle.       A total of 200 " units were used. A total of 0 units were discarded.        Diagnosis ICD-10-CM Associated Orders   1. Intractable chronic migraine without aura and without status migrainosus  G43.719 Botulinum Toxin Type A SOLR 200 Units     Chemodenervation     rimegepant sulfate (Nurtec) 75 mg TBDP     ondansetron (ZOFRAN-ODT) 4 mg disintegrating tablet     zonisamide (ZONEGRAN) 50 MG capsule      2. Migraine with aura and without status migrainosus, not intractable  G43.109 Chemodenervation     verapamil (Verelan) 120 MG 24 hr capsule          She reports nausea as more of a problem with her headaches than the migraines. Overall, frequency is 10 a month.   She continues to be on verapamil, protriptyline and zonegran.   Will add nurtec to her regimen as preventive agent.

## 2024-11-22 ENCOUNTER — TELEPHONE (OUTPATIENT)
Age: 34
End: 2024-11-22

## 2024-11-26 ENCOUNTER — PROCEDURE VISIT (OUTPATIENT)
Age: 34
End: 2024-11-26
Payer: COMMERCIAL

## 2024-11-26 VITALS
HEART RATE: 105 BPM | BODY MASS INDEX: 48.48 KG/M2 | HEIGHT: 65 IN | SYSTOLIC BLOOD PRESSURE: 130 MMHG | WEIGHT: 291 LBS | TEMPERATURE: 97 F | DIASTOLIC BLOOD PRESSURE: 100 MMHG

## 2024-11-26 DIAGNOSIS — G43.719 INTRACTABLE CHRONIC MIGRAINE WITHOUT AURA AND WITHOUT STATUS MIGRAINOSUS: Primary | ICD-10-CM

## 2024-11-26 PROCEDURE — 64615 CHEMODENERV MUSC MIGRAINE: CPT | Performed by: PSYCHIATRY & NEUROLOGY

## 2024-11-26 RX ORDER — PROTRIPTYLINE HYDROCHLORIDE 10 MG/1
10 TABLET, FILM COATED ORAL
Qty: 30 TABLET | Refills: 4 | Status: SHIPPED | OUTPATIENT
Start: 2024-11-26 | End: 2024-12-06 | Stop reason: SDUPTHER

## 2024-11-26 RX ORDER — RIMEGEPANT SULFATE 75 MG/75MG
75 TABLET, ORALLY DISINTEGRATING ORAL EVERY OTHER DAY
Qty: 16 TABLET | Refills: 3 | Status: SHIPPED | OUTPATIENT
Start: 2024-11-26 | End: 2024-12-06 | Stop reason: SDUPTHER

## 2024-11-26 RX ORDER — ZONISAMIDE 50 MG/1
50 CAPSULE ORAL
Qty: 30 CAPSULE | Refills: 4 | Status: SHIPPED | OUTPATIENT
Start: 2024-11-26 | End: 2024-12-06 | Stop reason: SDUPTHER

## 2024-11-26 NOTE — PROGRESS NOTES
"Universal Protocol   procedure performed by consultantConsent: Written consent obtained.  Consent given by: patient  Time out: Immediately prior to procedure a \"time out\" was called to verify the correct patient, procedure, equipment, support staff and site/side marked as required.  Timeout called at: 11/26/2024 12:07 PM.      Chemodenervation     Date/Time  11/26/2024 11:30 AM     Performed by  Usha Alamo MD   Authorized by  Usha Alamo MD     Pre-procedure details      Prepped With: Alcohol     Anesthesia  (see MAR for exact dosages):     Anesthesia method:  None   Botox      Botox Type:  Type A    Brand:  Botox    Final Concentration per CC:  50 units    Needle Gauge:  30 G 2.5 inch   Procedures      Botox Procedures: chronic headache      Indications: migraines     Post-procedure details      Chemodenervation:  Chronic migraine    Facial Nerve Location::  Bilateral facial nerve    Patient tolerance of procedure:  Tolerated well, no immediate complications       15 units, 2 sites unit(s) was injected into the procerus muscle.    5 unit(s) was injected into the  right  muscle.    5 unit(s) was injected into the  left  muscle.    10 unit(s) was injected into the  right frontalis muscle.    10 unit(s) was injected into the  left frontalis muscle.    5 units in center of forehead  20  unit(s) was injected into the  right temporalis muscle.    20  unit(s) was injected into the  left temporalis muscle.    15 unit(s) was injected into the  right occipitalis muscle.    15 unit(s) was injected into the  left occipitalis muscle.    20 unit(s) was injected into the  right cervical paraspinal muscle.    20 unit(s) was injected into the  left cervical paraspinal muscle.    20 unit(s) was injected into the  right trapezius muscle.    20 unit(s) was injected into the  left trapezius muscle.       A total of 200 units were used. A total of 0 units were discarded.      Diagnosis ICD-10-CM Associated " Orders   1. Intractable chronic migraine without aura and without status migrainosus  G43.719 Botulinum Toxin Type A SOLR 200 Units     protriptyline (VIVACTIL) 10 mg tablet     rimegepant sulfate (Nurtec) 75 mg TBDP     zonisamide (ZONEGRAN) 50 MG capsule

## 2024-12-06 DIAGNOSIS — G43.719 INTRACTABLE CHRONIC MIGRAINE WITHOUT AURA AND WITHOUT STATUS MIGRAINOSUS: ICD-10-CM

## 2024-12-06 RX ORDER — PROTRIPTYLINE HYDROCHLORIDE 10 MG/1
10 TABLET, FILM COATED ORAL
Qty: 30 TABLET | Refills: 0 | Status: SHIPPED | OUTPATIENT
Start: 2024-12-06

## 2024-12-06 RX ORDER — ZONISAMIDE 50 MG/1
50 CAPSULE ORAL
Qty: 30 CAPSULE | Refills: 0 | Status: SHIPPED | OUTPATIENT
Start: 2024-12-06

## 2024-12-06 RX ORDER — RIMEGEPANT SULFATE 75 MG/75MG
75 TABLET, ORALLY DISINTEGRATING ORAL EVERY OTHER DAY
Qty: 16 TABLET | Refills: 0 | Status: SHIPPED | OUTPATIENT
Start: 2024-12-06

## 2024-12-06 NOTE — TELEPHONE ENCOUNTER
Reason for call: Not a duplicate. Pharmacy change.   [x] Refill   [] Prior Auth  [] Other:     Office: [facility]  [] PCP/Provider -   [x] Specialty/Provider - Neurology/ Heeral Jasmeet     Medication: protriptyline (VIVACTIL), rimegepant sulfate (Nurtec), zonisamide (ZONEGRAN)     Dose/Frequency: 10 mg, 75 mg, 50 mg/ daily, 1 tab every other day, daily     Quantity: 30 day supply, 16 tabs, 30 day supply     Pharmacy: [facility]    Does the patient have enough for 3 days?   [] Yes   [x] No - Send as HP to POD

## 2025-01-08 DIAGNOSIS — G43.719 INTRACTABLE CHRONIC MIGRAINE WITHOUT AURA AND WITHOUT STATUS MIGRAINOSUS: ICD-10-CM

## 2025-01-09 RX ORDER — RIMEGEPANT SULFATE 75 MG/75MG
TABLET, ORALLY DISINTEGRATING ORAL
Qty: 16 TABLET | Refills: 0 | Status: SHIPPED | OUTPATIENT
Start: 2025-01-09

## 2025-01-09 RX ORDER — PROTRIPTYLINE HYDROCHLORIDE 10 MG/1
10 TABLET, FILM COATED ORAL
Qty: 30 TABLET | Refills: 0 | Status: SHIPPED | OUTPATIENT
Start: 2025-01-09

## 2025-01-21 DIAGNOSIS — G43.109 MIGRAINE WITH AURA AND WITHOUT STATUS MIGRAINOSUS, NOT INTRACTABLE: ICD-10-CM

## 2025-01-21 RX ORDER — VERAPAMIL HYDROCHLORIDE 120 MG/1
120 CAPSULE, EXTENDED RELEASE ORAL 2 TIMES DAILY
Qty: 180 CAPSULE | Refills: 1 | Status: SHIPPED | OUTPATIENT
Start: 2025-01-21

## 2025-01-21 NOTE — TELEPHONE ENCOUNTER
Authorizing Provider:  Usha Alamo MD   Neurology     verapamil (Verelan) 120 MG 24 hr capsule -Take 1 capsule (120 mg total) by mouth 2 (two) times a day     [facility]

## 2025-02-24 DIAGNOSIS — G43.719 INTRACTABLE CHRONIC MIGRAINE WITHOUT AURA AND WITHOUT STATUS MIGRAINOSUS: ICD-10-CM

## 2025-02-25 RX ORDER — PROTRIPTYLINE HYDROCHLORIDE 10 MG/1
10 TABLET, FILM COATED ORAL
Qty: 30 TABLET | Refills: 0 | Status: SHIPPED | OUTPATIENT
Start: 2025-02-25 | End: 2025-02-28 | Stop reason: SDUPTHER

## 2025-02-26 ENCOUNTER — TELEPHONE (OUTPATIENT)
Age: 35
End: 2025-02-26

## 2025-02-26 NOTE — TELEPHONE ENCOUNTER
Patient's botox auth F/U appt was scheduled for 9/24 1130 am added to wait list. I scheduled it an OVL appt.    Botox appt was also rescheduled for 9/25 added to wait list so that hopefully we can get her in scheduled sooner than later.

## 2025-02-26 NOTE — TELEPHONE ENCOUNTER
Patient needs appointment sooner than Sept. I contacted Patient and added her to [facility] schedule for 02/28 for f/u migraines for botox approval.

## 2025-02-27 DIAGNOSIS — G43.719 INTRACTABLE CHRONIC MIGRAINE WITHOUT AURA AND WITHOUT STATUS MIGRAINOSUS: ICD-10-CM

## 2025-02-27 RX ORDER — RIMEGEPANT SULFATE 75 MG/75MG
TABLET, ORALLY DISINTEGRATING ORAL
Qty: 16 TABLET | Refills: 0 | Status: SHIPPED | OUTPATIENT
Start: 2025-02-27 | End: 2025-02-28 | Stop reason: SDUPTHER

## 2025-02-28 ENCOUNTER — OFFICE VISIT (OUTPATIENT)
Age: 35
End: 2025-02-28
Payer: COMMERCIAL

## 2025-02-28 VITALS
HEART RATE: 100 BPM | WEIGHT: 286 LBS | BODY MASS INDEX: 47.65 KG/M2 | DIASTOLIC BLOOD PRESSURE: 96 MMHG | SYSTOLIC BLOOD PRESSURE: 130 MMHG | HEIGHT: 65 IN

## 2025-02-28 DIAGNOSIS — G43.719 INTRACTABLE CHRONIC MIGRAINE WITHOUT AURA AND WITHOUT STATUS MIGRAINOSUS: ICD-10-CM

## 2025-02-28 DIAGNOSIS — G43.109 MIGRAINE WITH AURA AND WITHOUT STATUS MIGRAINOSUS, NOT INTRACTABLE: ICD-10-CM

## 2025-02-28 PROCEDURE — 99213 OFFICE O/P EST LOW 20 MIN: CPT | Performed by: NURSE PRACTITIONER

## 2025-02-28 RX ORDER — RIMEGEPANT SULFATE 75 MG/75MG
75 TABLET, ORALLY DISINTEGRATING ORAL EVERY OTHER DAY
Qty: 16 TABLET | Refills: 6 | Status: SHIPPED | OUTPATIENT
Start: 2025-02-28

## 2025-02-28 RX ORDER — PHENTERMINE HYDROCHLORIDE 37.5 MG/1
CAPSULE ORAL
COMMUNITY
Start: 2025-02-24

## 2025-02-28 RX ORDER — ONDANSETRON 4 MG/1
4 TABLET, ORALLY DISINTEGRATING ORAL EVERY 8 HOURS PRN
Qty: 90 TABLET | Refills: 0 | Status: SHIPPED | OUTPATIENT
Start: 2025-02-28

## 2025-02-28 RX ORDER — VERAPAMIL HYDROCHLORIDE 120 MG/1
120 CAPSULE, EXTENDED RELEASE ORAL 2 TIMES DAILY
Qty: 180 CAPSULE | Refills: 1 | Status: SHIPPED | OUTPATIENT
Start: 2025-02-28

## 2025-02-28 RX ORDER — PROTRIPTYLINE HYDROCHLORIDE 10 MG/1
10 TABLET, FILM COATED ORAL
Qty: 30 TABLET | Refills: 6 | Status: SHIPPED | OUTPATIENT
Start: 2025-02-28

## 2025-02-28 RX ORDER — VORTIOXETINE 20 MG/1
1 TABLET, FILM COATED ORAL DAILY
COMMUNITY
Start: 2025-02-04

## 2025-02-28 RX ORDER — ZONISAMIDE 50 MG/1
50 CAPSULE ORAL
Qty: 30 CAPSULE | Refills: 0 | Status: SHIPPED | OUTPATIENT
Start: 2025-02-28

## 2025-02-28 NOTE — PATIENT INSTRUCTIONS
Continue with Nurtec 75mg every other day  Continue with zonisamide 50mg daily at bedtiem  Continue with protriptyline 10mg daily at bedtime  Continue with Verapamil 120mg twice a day  Can use zofran for nausea  Botox scheduled for mid March, follow up with Dr. Alamo for injections  
Statement Selected

## 2025-02-28 NOTE — PROGRESS NOTES
Name: Sharon Wyatt      : 1990      MRN: [id number]  Encounter Provider: CHRISTIANO Lainez  Encounter Date: 2025   Encounter department: [facility]  :  Assessment & Plan  Intractable chronic migraine without aura and without status migrainosus    Orders:    zonisamide (ZONEGRAN) 50 MG capsule; Take 1 capsule (50 mg total) by mouth daily at bedtime    ondansetron (ZOFRAN-ODT) 4 mg disintegrating tablet; Take 1 tablet (4 mg total) by mouth every 8 (eight) hours as needed for nausea or vomiting    rimegepant sulfate (Nurtec) 75 mg TBDP; Take 1 tablet (75 mg total) by mouth every other day    protriptyline (VIVACTIL) 10 mg tablet; Take 1 tablet (10 mg total) by mouth daily at bedtime    Migraine with aura and without status migrainosus, not intractable    Orders:    verapamil (Verelan) 120 MG 24 hr capsule; Take 1 capsule (120 mg total) by mouth 2 (two) times a day      Patient Instructions   Continue with Nurtec 75mg every other day  Continue with zonisamide 50mg daily at bedtiem  Continue with protriptyline 10mg daily at bedtime  Continue with Verapamil 120mg twice a day  Can use zofran for nausea  Botox scheduled for , follow up with Dr. Alamo for injections     History of Present Illness   Sharon Wyatt is a 30 yo F with PMH of migraines and follows for medical management.  She has had migraine since childhood and was a former pt of Dr. Duque.    When we initially seen she was getting 2 migraine a month but some months she would increase to 4 a month. Weather changes seem to be a trigger 2nd to humidity.  Abortive options help but give her an odd sensation.    She had been on amitriptyline but tapered off due to weight gain.   She was on verapamil 120mg bid and Topamax 100mg daily.  Pt noted worsening migraine therefore recommended Botox injection.  With the botox she gets 1 migraine that was severe and 203 mild intensity headache a month.  She had been getting about 20+ per month.  Pt tried Aimovig but it gave her GI side effects and was then switched to Emgality.  Migraines worsened until Botox was started.   Patient had been continued on verapamil 120 mg twice a day, Topamax and protriptyline was added at bedtime.  However patient has since weaned off of her Topamax and was started on zonisamide 50 mg daily at bedtime.  Patient gets she continued Verapamil, Topamax and added protriptyline 10mg at hs.   She has been getting Botox every 3 months.  To note: pt also had some issues with tinnitus and dizziness. She was referred to ENT and VT.     Patient reports back to the office today, she reports that the combination of her medications including Nurtec every other day and her Botox injections have been working fairly well for her regarding her migraine management.  Patient states she still gets approximately 1 mild migraine every week, she gets 1 severe migraine every 3 months.  She knows to severe migraines due to stable her caused significant nausea.  She continues on verapamil 120 mg twice a day, zonisamide 50 mg daily and protriptyline 10 mg daily at bedtime.  She does report protriptyline does not cause her sleepiness, does not had any benefits for her sleep.  Patient uses Excedrin Migraine and/or Advil for abortive medications.  She also uses Tiger balm  and notes that that does seem to help as well.  Patient stated with the addition of the Nurtec and her Botox, she has had significant reduction in the pain that she was experiencing.  Previously before this combination, she was getting at least 1 severe migraine that was debilitating every month with multiple frequent moderate degree of migraines throughout the month.  The number and intensity of her migraines have significantly decreased with the use of Botox and Nurtec.  Currently patient denies any lightheadedness, dizziness, visual changes, speech or swallowing changes, paresthesias,  "weaknesses, imbalances or falls.  She has had no issues with memory and/or \"brain fog\".  Overall she is happy with the effectiveness of her current regimen and no changes are being made at this time.  Patient is scheduled for Botox injections on March 12, 2025.           Review of Systems   Constitutional:  Negative for chills and fever.   HENT:  Negative for ear pain and sore throat.    Eyes:  Positive for visual disturbance. Negative for pain.   Respiratory:  Negative for cough and shortness of breath.    Cardiovascular:  Negative for chest pain and palpitations.   Gastrointestinal:  Positive for nausea. Negative for abdominal pain and vomiting.   Genitourinary:  Negative for dysuria and hematuria.   Musculoskeletal:  Negative for arthralgias and back pain.   Skin:  Negative for color change and rash.   Neurological:  Positive for headaches. Negative for seizures and syncope.   All other systems reviewed and are negative.   I have personally reviewed the MA's review of systems and made changes as necessary.        Past Medical History:   Diagnosis Date    H/O anxiety disorder     H/O: depression     Migraine with aura     Seasonal allergies        Past Surgical History:   Procedure Laterality Date    UPPER GASTROINTESTINAL ENDOSCOPY      WISDOM TOOTH EXTRACTION         Social History     Socioeconomic History    Marital status: Single     Spouse name: None    Number of children: None    Years of education: None    Highest education level: None   Occupational History    None   Tobacco Use    Smoking status: Never    Smokeless tobacco: Never   Vaping Use    Vaping status: Never Used   Substance and Sexual Activity    Alcohol use: Yes     Comment: rarely    Drug use: No    Sexual activity: None   Other Topics Concern    None   Social History Narrative    None     Social Drivers of Health     Financial Resource Strain: Not on file   Food Insecurity: Not on file   Transportation Needs: Not on file   Physical Activity: " Not on file   Stress: Not on file   Social Connections: Not on file   Intimate Partner Violence: Not on file   Housing Stability: Not on file       Family History   Problem Relation Age of Onset    Migraines Mother     Seasonal affective disorder Father     Anxiety disorder Sister     Migraines Maternal Aunt     Migraines Maternal Grandmother          Current Outpatient Medications:     ondansetron (ZOFRAN-ODT) 4 mg disintegrating tablet, Take 1 tablet (4 mg total) by mouth every 8 (eight) hours as needed for nausea or vomiting, Disp: 90 tablet, Rfl: 0    phentermine 37.5 MG capsule, , Disp: , Rfl:     protriptyline (VIVACTIL) 10 mg tablet, Take 1 tablet (10 mg total) by mouth daily at bedtime, Disp: 30 tablet, Rfl: 6    rimegepant sulfate (Nurtec) 75 mg TBDP, Take 1 tablet (75 mg total) by mouth every other day, Disp: 16 tablet, Rfl: 6    Trintellix 20 MG tablet, Take 1 tablet by mouth in the morning, Disp: , Rfl:     verapamil (Verelan) 120 MG 24 hr capsule, Take 1 capsule (120 mg total) by mouth 2 (two) times a day, Disp: 180 capsule, Rfl: 1    zonisamide (ZONEGRAN) 50 MG capsule, Take 1 capsule (50 mg total) by mouth daily at bedtime, Disp: 30 capsule, Rfl: 0    butalbital-acetaminophen-caffeine (FIORICET,ESGIC) -40 mg per tablet, Take 1 tablet by mouth daily as needed for migraine, Disp: 20 tablet, Rfl: 0    naproxen (NAPROSYN) 500 mg tablet, Take 1 tablet (500 mg total) by mouth as needed for moderate pain (migraines) (Patient not taking: Reported on 2/28/2025), Disp: 20 tablet, Rfl: 0    Current Facility-Administered Medications:     Botulinum Toxin Type A SOLR 200 Units, 200 Units, Injection, Q3 Months, Usha Alamo MD, 200 Units at 09/30/21 1134    Botulinum Toxin Type A SOLR 200 Units, 200 Units, Injection, Q3 Months, Usha Alamo MD, 200 Units at 01/03/22 1208    Botulinum Toxin Type A SOLR 200 Units, 200 Units, Injection, Q3 Months, Usha Alamo MD, 200 Units at 07/05/22 1626    Botulinum Toxin  "Type A SOLR 200 Units, 200 Units, Injection, Q3 Months, Usha Alamo MD, 200 Units at 07/06/22 1030    Botulinum Toxin Type A SOLR 200 Units, 200 Units, Injection, Q3 Months, Usha Alamo MD, 200 Units at 10/24/22 1059    Botulinum Toxin Type A SOLR 200 Units, 200 Units, Injection, Q3 Months, Usha Alamo MD, 200 Units at 01/25/23 1628    Botulinum Toxin Type A SOLR 200 Units, 200 Units, Injection, , Usha Alamo MD, 200 Units at 01/25/23 1613    Botulinum Toxin Type A SOLR 200 Units, 200 Units, Injection, Q3 Months, Usha Alamo MD, 200 Units at 04/26/23 1107    Botulinum Toxin Type A SOLR 200 Units, 200 Units, Injection, Q3 Months, Usha Alamo MD, 200 Units at 07/31/23 1147    Botulinum Toxin Type A SOLR 200 Units, 200 Units, Injection, Q3 Months, Usha Alamo MD, 200 Units at 11/02/23 1149    Botulinum Toxin Type A SOLR 200 Units, 200 Units, Injection, Q3 Months, Usha Alamo MD, 200 Units at 02/07/24 1225    Botulinum Toxin Type A SOLR 200 Units, 200 Units, Injection, Q3 Months, , 200 Units at 05/15/24 1056    Botulinum Toxin Type A SOLR 200 Units, 200 Units, Injection, Q3 Months, , 200 Units at 08/21/24 1314    Botulinum Toxin Type A SOLR 200 Units, 200 Units, Injection, Q3 Months, , 200 Units at 11/26/24 1228    Allergies   Allergen Reactions    Chocolate - Food Allergy Headache    Cocoa Extract (Fruit) - Food Allergy Headache     Other reaction(s): Headache    Other Headache     Annotation - [id number]: NITRATES- WINE        Blood pressure 130/96, pulse 100, height 5' 5\" (1.651 m), weight 130 kg (286 lb).       Objective   /96 (BP Location: Left arm, Patient Position: Sitting, Cuff Size: Large)   Pulse 100   Ht 5' 5\" (1.651 m)   Wt 130 kg (286 lb)   BMI 47.59 kg/m²     Physical Exam  Vitals reviewed.   Constitutional:       Appearance: Normal appearance. She is well-developed.   HENT:      Head: Normocephalic.      Nose: Nose normal.      Mouth/Throat:      Mouth: Mucous membranes are moist. "   Eyes:      General: Lids are normal.      Extraocular Movements: Extraocular movements intact.      Pupils: Pupils are equal, round, and reactive to light.   Pulmonary:      Effort: Pulmonary effort is normal.   Abdominal:      Palpations: Abdomen is soft.   Musculoskeletal:      Cervical back: Normal range of motion.   Skin:     General: Skin is warm and dry.   Neurological:      Mental Status: She is alert.      Motor: Motor strength is normal.     Coordination: Romberg sign negative.      Deep Tendon Reflexes: Reflexes are normal and symmetric.   Psychiatric:         Attention and Perception: Attention and perception normal.         Mood and Affect: Mood and affect normal.         Speech: Speech normal.         Behavior: Behavior normal. Behavior is cooperative.         Thought Content: Thought content normal.         Cognition and Memory: Cognition and memory normal.         Judgment: Judgment normal.       Neurological Exam  Mental Status  Alert. Oriented to person, place, time and situation. Memory is normal. Recent and remote memory are intact. Speech is normal. Language is fluent with no aphasia. Attention and concentration are normal. Fund of knowledge is appropriate for level of education.    Cranial Nerves  CN II: Visual acuity is normal. Visual fields full to confrontation.  CN III, IV, VI: Extraocular movements intact bilaterally. Normal lids and orbits bilaterally. Pupils equal round and reactive to light bilaterally.  CN V: Facial sensation is normal.  CN VII: Full and symmetric facial movement.  CN VIII: Hearing is normal.  CN IX, X: Palate elevates symmetrically. Normal gag reflex.  CN XI: Shoulder shrug strength is normal.  CN XII: Tongue midline without atrophy or fasciculations.    Motor  Normal muscle bulk throughout. Normal muscle tone. No abnormal involuntary movements. Strength is 5/5 throughout all four extremities.    Sensory  Light touch is normal in upper and lower extremities.  Temperature is normal in upper and lower extremities. Vibration is normal in upper and lower extremities. Proprioception is normal in upper and lower extremities.     Reflexes  Deep tendon reflexes are 2+ and symmetric in all four extremities.    Right pathological reflexes: Denver's absent.  Left pathological reflexes: Denver's absent.    Coordination  Right: Finger-to-nose normal. Rapid alternating movement normal. Heel-to-shin normal.Left: Finger-to-nose normal. Rapid alternating movement normal. Heel-to-shin normal.    Gait  Casual gait is normal including stance, stride, and arm swing.Normal toe walking. Normal heel walking. Normal tandem gait. Romberg is absent. Able to rise from chair without using arms.      Radiology Results Review : No pertinent imaging studies reviewed.    Administrative Statements   I have spent a total time of 25 minutes in caring for this patient on the day of the visit/encounter including Risks and benefits of tx options, Instructions for management, Patient and family education, Counseling / Coordination of care, Documenting in the medical record, Reviewing/placing orders in the medical record (including tests, medications, and/or procedures), and Obtaining or reviewing history  .

## 2025-03-05 ENCOUNTER — TELEPHONE (OUTPATIENT)
Age: 35
End: 2025-03-05

## 2025-03-05 NOTE — TELEPHONE ENCOUNTER
...LMOM for patient reminding them of upcoming appointment with Dr. Alamo, new office location and office phone number.

## 2025-03-06 ENCOUNTER — TELEPHONE (OUTPATIENT)
Age: 35
End: 2025-03-06

## 2025-03-06 NOTE — TELEPHONE ENCOUNTER
Botox number of units: 200 units  Botox quantity: 1  Arrived at what location: [place]  Botox at Correct Administering Location: yes  NDC number: 3838-6893-39  Lot number: [id number]  Expiration Date: 05/2027  Appt notes indicate correct medication: yes

## 2025-03-12 ENCOUNTER — PROCEDURE VISIT (OUTPATIENT)
Age: 35
End: 2025-03-12
Payer: COMMERCIAL

## 2025-03-12 VITALS
BODY MASS INDEX: 46.65 KG/M2 | SYSTOLIC BLOOD PRESSURE: 112 MMHG | HEART RATE: 92 BPM | WEIGHT: 280 LBS | HEIGHT: 65 IN | OXYGEN SATURATION: 99 % | DIASTOLIC BLOOD PRESSURE: 84 MMHG

## 2025-03-12 DIAGNOSIS — G43.719 INTRACTABLE CHRONIC MIGRAINE WITHOUT AURA AND WITHOUT STATUS MIGRAINOSUS: Primary | ICD-10-CM

## 2025-03-12 PROCEDURE — 64615 CHEMODENERV MUSC MIGRAINE: CPT | Performed by: PSYCHIATRY & NEUROLOGY

## 2025-03-12 RX ORDER — ONDANSETRON 4 MG/1
4 TABLET, ORALLY DISINTEGRATING ORAL EVERY 8 HOURS PRN
Qty: 90 TABLET | Refills: 0 | Status: SHIPPED | OUTPATIENT
Start: 2025-03-12

## 2025-03-12 RX ORDER — RIMEGEPANT SULFATE 75 MG/75MG
75 TABLET, ORALLY DISINTEGRATING ORAL EVERY OTHER DAY
Qty: 16 TABLET | Refills: 6 | Status: SHIPPED | OUTPATIENT
Start: 2025-03-12

## 2025-03-12 NOTE — PROGRESS NOTES
"Universal Protocol   procedure performed by consultantConsent: Written consent obtained.  Consent given by: patient  Time out: Immediately prior to procedure a \"time out\" was called to verify the correct patient, procedure, equipment, support staff and site/side marked as required.  Timeout called at: 3/12/2025 12:46 PM.      Chemodenervation     Date/Time  3/12/2025 12:30 PM     Performed by  Usha Alamo MD   Authorized by  Usha Alamo MD     Pre-procedure details      Prepped With: Alcohol     Anesthesia  (see MAR for exact dosages):     Anesthesia method:  None   Procedure details      Position:  Upright   Botox      Botox Type:  Type A    Brand:  Botox    Final Concentration per CC:  50 units    Needle Gauge:  30 G 2.5 inch   Procedures      Botox Procedures: chronic headache      Indications: migraines     Post-procedure details      Chemodenervation:  Chronic migraine    Facial Nerve Location::  Bilateral facial nerve    Patient tolerance of procedure:  Tolerated well, no immediate complications       15 units, 2 sites unit(s) was injected into the procerus muscle.    5 unit(s) was injected into the  right  muscle.    5 unit(s) was injected into the  left  muscle.    10 unit(s) was injected into the  right frontalis muscle.    10 unit(s) was injected into the  left frontalis muscle.    5 units in center of forehead  20  unit(s) was injected into the  right temporalis muscle.    20  unit(s) was injected into the  left temporalis muscle.    15 unit(s) was injected into the  right occipitalis muscle.    15 unit(s) was injected into the  left occipitalis muscle.    20 unit(s) was injected into the  right cervical paraspinal muscle.    20 unit(s) was injected into the  left cervical paraspinal muscle.    20 unit(s) was injected into the  right trapezius muscle.    20 unit(s) was injected into the  left trapezius muscle.       A total of 200 units were used. A total of 0 units were discarded. "        Diagnosis ICD-10-CM Associated Orders   1. Intractable chronic migraine without aura and without status migrainosus  G43.719 Botulinum Toxin Type A SOLR 200 Units     Chemodenervation     ondansetron (ZOFRAN-ODT) 4 mg disintegrating tablet     rimegepant sulfate (Nurtec) 75 mg TBDP

## 2025-03-25 ENCOUNTER — TELEPHONE (OUTPATIENT)
Age: 35
End: 2025-03-25

## 2025-03-25 NOTE — TELEPHONE ENCOUNTER
Patient called to schedule BINJ for June; last BINJ 3/12/25 Usha Alamo MD and next is scheduled for 9/25/25 11:30.  Advised will forward request to have someone call her back.    Please assist,    Thank you

## 2025-05-05 DIAGNOSIS — G43.719 INTRACTABLE CHRONIC MIGRAINE WITHOUT AURA AND WITHOUT STATUS MIGRAINOSUS: ICD-10-CM

## 2025-05-06 RX ORDER — ZONISAMIDE 50 MG/1
CAPSULE ORAL
Qty: 30 CAPSULE | Refills: 0 | Status: SHIPPED | OUTPATIENT
Start: 2025-05-06

## 2025-05-06 NOTE — TELEPHONE ENCOUNTER
Patient needs updated blood work. Please place orders (CMP). A courtesy refill was provided.   Upcoming botox appt scheduled 6/16/25

## 2025-06-10 ENCOUNTER — TELEPHONE (OUTPATIENT)
Dept: NEUROLOGY | Facility: CLINIC | Age: 35
End: 2025-06-10

## 2025-06-10 NOTE — TELEPHONE ENCOUNTER
Botox number of units: 200 units  Botox quantity: 1  Arrived at what location: [place]  Botox at Correct Administering Location: yes  NDC number: 2204-2306-11  Lot number: [id number]  Expiration Date: 10/2027  Appt notes indicate correct medication: yes

## 2025-06-16 ENCOUNTER — PROCEDURE VISIT (OUTPATIENT)
Age: 35
End: 2025-06-16
Payer: COMMERCIAL

## 2025-06-16 VITALS
WEIGHT: 283 LBS | SYSTOLIC BLOOD PRESSURE: 126 MMHG | BODY MASS INDEX: 47.15 KG/M2 | HEART RATE: 101 BPM | HEIGHT: 65 IN | OXYGEN SATURATION: 98 % | DIASTOLIC BLOOD PRESSURE: 76 MMHG

## 2025-06-16 DIAGNOSIS — G43.709 CHRONIC MIGRAINE WITHOUT AURA: Primary | ICD-10-CM

## 2025-06-16 PROCEDURE — 64615 CHEMODENERV MUSC MIGRAINE: CPT | Performed by: PSYCHIATRY & NEUROLOGY

## 2025-06-16 NOTE — PROGRESS NOTES
"Universal Protocol   procedure performed by consultantConsent: Written consent obtained  Consent given by: patient  Time out: Immediately prior to procedure a \"time out\" was called to verify the correct patient, procedure, equipment, support staff and site/side marked as required.  Timeout called at: 6/16/2025 12:05 PM.      Chemodenervation     Date/Time  6/16/2025 12:00 PM     Performed by  Usha Alamo MD   Authorized by  Usha Alamo MD     Pre-procedure details      Prepped With: Alcohol     Anesthesia  (see MAR for exact dosages):     Anesthesia method:  None   Procedure details      Position:  Upright   Botox      Botox Type:  Type A    Brand:  Botox    Final Concentration per CC:  50 units    Needle Gauge:  30 G 2.5 inch   Procedures      Botox Procedures: chronic headache      Indications: migraines     Post-procedure details      Chemodenervation:  Chronic migraine    Facial Nerve Location::  Bilateral facial nerve    Patient tolerance of procedure:  Tolerated well, no immediate complications       15 units, 2 sites unit(s) was injected into the procerus muscle.    5 unit(s) was injected into the  right  muscle.    5 unit(s) was injected into the  left  muscle.    10 unit(s) was injected into the  right frontalis muscle.    10 unit(s) was injected into the  left frontalis muscle.    5 units in center of forehead  20  unit(s) was injected into the  right temporalis muscle.    20  unit(s) was injected into the  left temporalis muscle.    15 unit(s) was injected into the  right occipitalis muscle.    15 unit(s) was injected into the  left occipitalis muscle.    20 unit(s) was injected into the  right cervical paraspinal muscle.    20 unit(s) was injected into the  left cervical paraspinal muscle.    20 unit(s) was injected into the  right trapezius muscle.    20 unit(s) was injected into the  left trapezius muscle.       A total of 200 units were used. A total of 0 units were discarded. "        Diagnosis ICD-10-CM Associated Orders   1. Chronic migraine without aura  G43.709 Chemodenervation

## 2025-07-02 DIAGNOSIS — G43.719 INTRACTABLE CHRONIC MIGRAINE WITHOUT AURA AND WITHOUT STATUS MIGRAINOSUS: ICD-10-CM

## 2025-07-03 RX ORDER — ONDANSETRON 4 MG/1
4 TABLET, ORALLY DISINTEGRATING ORAL EVERY 8 HOURS PRN
Qty: 90 TABLET | Refills: 0 | Status: SHIPPED | OUTPATIENT
Start: 2025-07-03

## 2025-07-09 DIAGNOSIS — G43.719 INTRACTABLE CHRONIC MIGRAINE WITHOUT AURA AND WITHOUT STATUS MIGRAINOSUS: ICD-10-CM

## 2025-07-09 RX ORDER — ZONISAMIDE 50 MG/1
50 CAPSULE ORAL DAILY
Qty: 90 CAPSULE | Refills: 0 | Status: SHIPPED | OUTPATIENT
Start: 2025-07-09

## 2025-07-09 NOTE — TELEPHONE ENCOUNTER
Reason for call:   [x] Refill   [] Prior Auth  [] Other:     Office:   [] PCP/Provider -   [x] Specialty/Provider - Neuro, Alamo    Medication:   Zonisamide 50 mg, 1 hs, 90    Pharmacy:   [facility]   Does the patient have enough for 3 days?   [] Yes   [x] No - Send as HP to POD    Mail Away Pharmacy   Does the patient have enough for 10 days?   [] Yes   [] No - Send as HP to POD